# Patient Record
Sex: MALE | Race: WHITE | NOT HISPANIC OR LATINO | Employment: OTHER | URBAN - METROPOLITAN AREA
[De-identification: names, ages, dates, MRNs, and addresses within clinical notes are randomized per-mention and may not be internally consistent; named-entity substitution may affect disease eponyms.]

---

## 2024-07-24 ENCOUNTER — HOSPITAL ENCOUNTER (INPATIENT)
Facility: MEDICAL CENTER | Age: 84
LOS: 4 days | End: 2024-07-28
Attending: INTERNAL MEDICINE | Admitting: HOSPITALIST
Payer: MEDICARE

## 2024-07-24 DIAGNOSIS — E87.29 HIGH ANION GAP METABOLIC ACIDOSIS: ICD-10-CM

## 2024-07-24 PROBLEM — E87.6 HYPOKALEMIA: Status: ACTIVE | Noted: 2024-07-24

## 2024-07-24 PROBLEM — E11.10 DKA, TYPE 2, NOT AT GOAL (HCC): Status: ACTIVE | Noted: 2024-07-24

## 2024-07-24 PROBLEM — F10.11 HISTORY OF ALCOHOL ABUSE: Status: ACTIVE | Noted: 2024-07-24

## 2024-07-24 PROBLEM — F32.9 REACTIVE DEPRESSION: Status: ACTIVE | Noted: 2024-07-24

## 2024-07-24 PROBLEM — I48.0 PAROXYSMAL ATRIAL FIBRILLATION (HCC): Status: ACTIVE | Noted: 2024-07-24

## 2024-07-24 PROBLEM — K21.9 GERD (GASTROESOPHAGEAL REFLUX DISEASE): Status: ACTIVE | Noted: 2024-07-24

## 2024-07-24 LAB
ANION GAP SERPL CALC-SCNC: 16 MMOL/L (ref 7–16)
BUN SERPL-MCNC: 7 MG/DL (ref 8–22)
CALCIUM SERPL-MCNC: 8.5 MG/DL (ref 8.4–10.2)
CHLORIDE SERPL-SCNC: 102 MMOL/L (ref 96–112)
CO2 SERPL-SCNC: 19 MMOL/L (ref 20–33)
CREAT SERPL-MCNC: 0.64 MG/DL (ref 0.5–1.4)
ERYTHROCYTE [DISTWIDTH] IN BLOOD BY AUTOMATED COUNT: 47.7 FL (ref 35.9–50)
GFR SERPLBLD CREATININE-BSD FMLA CKD-EPI: 94 ML/MIN/1.73 M 2
GLUCOSE BLD STRIP.AUTO-MCNC: 122 MG/DL (ref 65–99)
GLUCOSE BLD STRIP.AUTO-MCNC: 123 MG/DL (ref 65–99)
GLUCOSE SERPL-MCNC: 117 MG/DL (ref 65–99)
HCT VFR BLD AUTO: 39.5 % (ref 42–52)
HGB BLD-MCNC: 13.8 G/DL (ref 14–18)
MAGNESIUM SERPL-MCNC: 1.7 MG/DL (ref 1.5–2.5)
MCH RBC QN AUTO: 33.7 PG (ref 27–33)
MCHC RBC AUTO-ENTMCNC: 34.9 G/DL (ref 32.3–36.5)
MCV RBC AUTO: 96.3 FL (ref 81.4–97.8)
PLATELET # BLD AUTO: 276 K/UL (ref 164–446)
PMV BLD AUTO: 10 FL (ref 9–12.9)
POTASSIUM SERPL-SCNC: 3.7 MMOL/L (ref 3.6–5.5)
RBC # BLD AUTO: 4.1 M/UL (ref 4.7–6.1)
SODIUM SERPL-SCNC: 137 MMOL/L (ref 135–145)
WBC # BLD AUTO: 7.9 K/UL (ref 4.8–10.8)

## 2024-07-24 PROCEDURE — 83036 HEMOGLOBIN GLYCOSYLATED A1C: CPT

## 2024-07-24 PROCEDURE — 770022 HCHG ROOM/CARE - ICU (200)

## 2024-07-24 PROCEDURE — 80048 BASIC METABOLIC PNL TOTAL CA: CPT

## 2024-07-24 PROCEDURE — 85027 COMPLETE CBC AUTOMATED: CPT

## 2024-07-24 PROCEDURE — A9270 NON-COVERED ITEM OR SERVICE: HCPCS | Performed by: HOSPITALIST

## 2024-07-24 PROCEDURE — 700102 HCHG RX REV CODE 250 W/ 637 OVERRIDE(OP): Performed by: HOSPITALIST

## 2024-07-24 PROCEDURE — 82962 GLUCOSE BLOOD TEST: CPT

## 2024-07-24 PROCEDURE — 99223 1ST HOSP IP/OBS HIGH 75: CPT | Mod: AI | Performed by: HOSPITALIST

## 2024-07-24 PROCEDURE — 700105 HCHG RX REV CODE 258: Performed by: HOSPITALIST

## 2024-07-24 PROCEDURE — 700111 HCHG RX REV CODE 636 W/ 250 OVERRIDE (IP): Performed by: INTERNAL MEDICINE

## 2024-07-24 PROCEDURE — 83735 ASSAY OF MAGNESIUM: CPT

## 2024-07-24 RX ORDER — SODIUM CHLORIDE, SODIUM LACTATE, POTASSIUM CHLORIDE, CALCIUM CHLORIDE 600; 310; 30; 20 MG/100ML; MG/100ML; MG/100ML; MG/100ML
INJECTION, SOLUTION INTRAVENOUS CONTINUOUS
Status: DISCONTINUED | OUTPATIENT
Start: 2024-07-24 | End: 2024-07-25

## 2024-07-24 RX ORDER — AMOXICILLIN 250 MG
2 CAPSULE ORAL EVERY EVENING
Status: DISCONTINUED | OUTPATIENT
Start: 2024-07-24 | End: 2024-07-28 | Stop reason: HOSPADM

## 2024-07-24 RX ORDER — DILTIAZEM HYDROCHLORIDE 240 MG/1
240 CAPSULE, COATED, EXTENDED RELEASE ORAL DAILY
COMMUNITY

## 2024-07-24 RX ORDER — DILTIAZEM HYDROCHLORIDE 120 MG/1
120 CAPSULE, COATED, EXTENDED RELEASE ORAL DAILY
Status: DISCONTINUED | OUTPATIENT
Start: 2024-07-25 | End: 2024-07-26

## 2024-07-24 RX ORDER — ONDANSETRON 4 MG/1
4 TABLET, ORALLY DISINTEGRATING ORAL EVERY 4 HOURS PRN
Status: DISCONTINUED | OUTPATIENT
Start: 2024-07-24 | End: 2024-07-28 | Stop reason: HOSPADM

## 2024-07-24 RX ORDER — DULOXETIN HYDROCHLORIDE 20 MG/1
20 CAPSULE, DELAYED RELEASE ORAL DAILY
Status: DISCONTINUED | OUTPATIENT
Start: 2024-07-25 | End: 2024-07-28 | Stop reason: HOSPADM

## 2024-07-24 RX ORDER — POTASSIUM CHLORIDE 1500 MG/1
10 TABLET, EXTENDED RELEASE ORAL DAILY
Status: DISCONTINUED | OUTPATIENT
Start: 2024-07-25 | End: 2024-07-28 | Stop reason: HOSPADM

## 2024-07-24 RX ORDER — MAGNESIUM SULFATE HEPTAHYDRATE 40 MG/ML
2 INJECTION, SOLUTION INTRAVENOUS ONCE
Status: COMPLETED | OUTPATIENT
Start: 2024-07-24 | End: 2024-07-25

## 2024-07-24 RX ORDER — DULOXETIN HYDROCHLORIDE 20 MG/1
20 CAPSULE, DELAYED RELEASE ORAL DAILY
COMMUNITY

## 2024-07-24 RX ORDER — ONDANSETRON 2 MG/ML
4 INJECTION INTRAMUSCULAR; INTRAVENOUS EVERY 4 HOURS PRN
Status: DISCONTINUED | OUTPATIENT
Start: 2024-07-24 | End: 2024-07-28 | Stop reason: HOSPADM

## 2024-07-24 RX ORDER — ESCITALOPRAM OXALATE 10 MG/1
20 TABLET ORAL DAILY
COMMUNITY

## 2024-07-24 RX ORDER — GABAPENTIN 300 MG/1
300 CAPSULE ORAL DAILY
COMMUNITY

## 2024-07-24 RX ORDER — POLYETHYLENE GLYCOL 3350 17 G/17G
1 POWDER, FOR SOLUTION ORAL
Status: DISCONTINUED | OUTPATIENT
Start: 2024-07-24 | End: 2024-07-28 | Stop reason: HOSPADM

## 2024-07-24 RX ORDER — FINASTERIDE 5 MG/1
5 TABLET, FILM COATED ORAL DAILY
COMMUNITY

## 2024-07-24 RX ORDER — FINASTERIDE 5 MG/1
5 TABLET, FILM COATED ORAL DAILY
Status: DISCONTINUED | OUTPATIENT
Start: 2024-07-25 | End: 2024-07-28 | Stop reason: HOSPADM

## 2024-07-24 RX ORDER — POTASSIUM CHLORIDE 1.5 G/1.58G
10 POWDER, FOR SOLUTION ORAL DAILY
COMMUNITY

## 2024-07-24 RX ORDER — DEXTROSE MONOHYDRATE 25 G/50ML
25 INJECTION, SOLUTION INTRAVENOUS
Status: DISCONTINUED | OUTPATIENT
Start: 2024-07-24 | End: 2024-07-25

## 2024-07-24 RX ORDER — GABAPENTIN 300 MG/1
300 CAPSULE ORAL DAILY
Status: DISCONTINUED | OUTPATIENT
Start: 2024-07-25 | End: 2024-07-28 | Stop reason: HOSPADM

## 2024-07-24 RX ORDER — LABETALOL HYDROCHLORIDE 5 MG/ML
10 INJECTION, SOLUTION INTRAVENOUS EVERY 4 HOURS PRN
Status: DISCONTINUED | OUTPATIENT
Start: 2024-07-24 | End: 2024-07-28 | Stop reason: HOSPADM

## 2024-07-24 RX ORDER — PANTOPRAZOLE SODIUM 40 MG/1
40 TABLET, DELAYED RELEASE ORAL DAILY
COMMUNITY

## 2024-07-24 RX ORDER — ESCITALOPRAM OXALATE 10 MG/1
20 TABLET ORAL DAILY
Status: DISCONTINUED | OUTPATIENT
Start: 2024-07-25 | End: 2024-07-28 | Stop reason: HOSPADM

## 2024-07-24 RX ORDER — POTASSIUM CHLORIDE 7.45 MG/ML
10 INJECTION INTRAVENOUS
Status: COMPLETED | OUTPATIENT
Start: 2024-07-24 | End: 2024-07-25

## 2024-07-24 RX ADMIN — SENNOSIDES AND DOCUSATE SODIUM 2 TABLET: 50; 8.6 TABLET ORAL at 20:36

## 2024-07-24 RX ADMIN — POTASSIUM CHLORIDE 10 MEQ: 7.46 INJECTION, SOLUTION INTRAVENOUS at 22:23

## 2024-07-24 RX ADMIN — APIXABAN 5 MG: 5 TABLET, FILM COATED ORAL at 20:37

## 2024-07-24 RX ADMIN — POTASSIUM CHLORIDE 10 MEQ: 7.46 INJECTION, SOLUTION INTRAVENOUS at 23:25

## 2024-07-24 RX ADMIN — MAGNESIUM SULFATE HEPTAHYDRATE 2 G: 2 INJECTION, SOLUTION INTRAVENOUS at 22:24

## 2024-07-24 RX ADMIN — SODIUM CHLORIDE, POTASSIUM CHLORIDE, SODIUM LACTATE AND CALCIUM CHLORIDE: 600; 310; 30; 20 INJECTION, SOLUTION INTRAVENOUS at 20:42

## 2024-07-24 SDOH — ECONOMIC STABILITY: TRANSPORTATION INSECURITY
IN THE PAST 12 MONTHS, HAS LACK OF RELIABLE TRANSPORTATION KEPT YOU FROM MEDICAL APPOINTMENTS, MEETINGS, WORK OR FROM GETTING THINGS NEEDED FOR DAILY LIVING?: NO

## 2024-07-24 SDOH — ECONOMIC STABILITY: TRANSPORTATION INSECURITY
IN THE PAST 12 MONTHS, HAS THE LACK OF TRANSPORTATION KEPT YOU FROM MEDICAL APPOINTMENTS OR FROM GETTING MEDICATIONS?: NO

## 2024-07-24 ASSESSMENT — LIFESTYLE VARIABLES
TOTAL SCORE: 0
EVER HAD A DRINK FIRST THING IN THE MORNING TO STEADY YOUR NERVES TO GET RID OF A HANGOVER: NO
TOTAL SCORE: 0
AVERAGE NUMBER OF DAYS PER WEEK YOU HAVE A DRINK CONTAINING ALCOHOL: 0
CONSUMPTION TOTAL: POSITIVE
SUBSTANCE_ABUSE: 0
HAVE PEOPLE ANNOYED YOU BY CRITICIZING YOUR DRINKING: NO
ALCOHOL_USE: NO
TOTAL SCORE: 0
HOW MANY TIMES IN THE PAST YEAR HAVE YOU HAD 5 OR MORE DRINKS IN A DAY: 100
HAVE YOU EVER FELT YOU SHOULD CUT DOWN ON YOUR DRINKING: NO
ON A TYPICAL DAY WHEN YOU DRINK ALCOHOL HOW MANY DRINKS DO YOU HAVE: 0
EVER FELT BAD OR GUILTY ABOUT YOUR DRINKING: NO

## 2024-07-24 ASSESSMENT — ENCOUNTER SYMPTOMS
MUSCULOSKELETAL NEGATIVE: 1
POLYDIPSIA: 0
EYES NEGATIVE: 1
FOCAL WEAKNESS: 0
TINGLING: 0
ORTHOPNEA: 0
PND: 0
NEUROLOGICAL NEGATIVE: 1
INSOMNIA: 0
BLOOD IN STOOL: 0
EYE REDNESS: 0
SEIZURES: 0
RESPIRATORY NEGATIVE: 1
SENSORY CHANGE: 0
DEPRESSION: 0
PSYCHIATRIC NEGATIVE: 1
HEARTBURN: 0
BACK PAIN: 0
STRIDOR: 0
VOMITING: 1
MYALGIAS: 0
FEVER: 0
FLANK PAIN: 0
COUGH: 0
PHOTOPHOBIA: 0
EYE DISCHARGE: 0
SPUTUM PRODUCTION: 0
ABDOMINAL PAIN: 1
CARDIOVASCULAR NEGATIVE: 1
BRUISES/BLEEDS EASILY: 0
SINUS PAIN: 0
SHORTNESS OF BREATH: 0
CHILLS: 0
FALLS: 0
DOUBLE VISION: 0
WHEEZING: 0
DIZZINESS: 0
DIAPHORESIS: 0
NAUSEA: 1

## 2024-07-24 ASSESSMENT — COGNITIVE AND FUNCTIONAL STATUS - GENERAL
DAILY ACTIVITIY SCORE: 24
SUGGESTED CMS G CODE MODIFIER MOBILITY: CH
SUGGESTED CMS G CODE MODIFIER DAILY ACTIVITY: CH
MOBILITY SCORE: 24

## 2024-07-24 ASSESSMENT — SOCIAL DETERMINANTS OF HEALTH (SDOH)
WITHIN THE LAST YEAR, HAVE YOU BEEN HUMILIATED OR EMOTIONALLY ABUSED IN OTHER WAYS BY YOUR PARTNER OR EX-PARTNER?: NO
WITHIN THE PAST 12 MONTHS, THE FOOD YOU BOUGHT JUST DIDN'T LAST AND YOU DIDN'T HAVE MONEY TO GET MORE: NEVER TRUE
WITHIN THE LAST YEAR, HAVE YOU BEEN KICKED, HIT, SLAPPED, OR OTHERWISE PHYSICALLY HURT BY YOUR PARTNER OR EX-PARTNER?: NO
WITHIN THE LAST YEAR, HAVE YOU BEEN AFRAID OF YOUR PARTNER OR EX-PARTNER?: NO
WITHIN THE PAST 12 MONTHS, YOU WORRIED THAT YOUR FOOD WOULD RUN OUT BEFORE YOU GOT THE MONEY TO BUY MORE: NEVER TRUE
WITHIN THE LAST YEAR, HAVE TO BEEN RAPED OR FORCED TO HAVE ANY KIND OF SEXUAL ACTIVITY BY YOUR PARTNER OR EX-PARTNER?: NO
IN THE PAST 12 MONTHS, HAS THE ELECTRIC, GAS, OIL, OR WATER COMPANY THREATENED TO SHUT OFF SERVICE IN YOUR HOME?: NO

## 2024-07-24 ASSESSMENT — PATIENT HEALTH QUESTIONNAIRE - PHQ9
1. LITTLE INTEREST OR PLEASURE IN DOING THINGS: NOT AT ALL
SUM OF ALL RESPONSES TO PHQ9 QUESTIONS 1 AND 2: 0
2. FEELING DOWN, DEPRESSED, IRRITABLE, OR HOPELESS: NOT AT ALL

## 2024-07-24 ASSESSMENT — PAIN DESCRIPTION - PAIN TYPE: TYPE: ACUTE PAIN

## 2024-07-24 NOTE — PROGRESS NOTES
[facility] DIRECT ADMIT ACCEPTANCE NOTE    Transferring facility: [facility]  Transferring provider: Dr Fonseca    Chief complaint: N/V  Pertinent history & patient course: recently started on farxiga, presumably for HF found to have ketotic AGMA, A1C 5.7% - started on insulin and gluocse gtt  Pertinent imaging & lab results: bicarb 18, AG 25, + ketones, A1C 10.7%, other labs normal   Consultants called prior to transfer and pertinent input from consultants: none  Code Status: full per transferring provider, I personally verified with the transferring provider patient's code status and the transferring provider has confirmed this with the patient.  Reason for Transfer: icu level of care  Further work up or recommendations requested prior to transfer: no    Patient accepted for transfer: Yes  Accepting [facility] Facility: [facility] - Nursing to notify the admitting provider when patient arrives to the unit.    Consultants to be called upon arrival: none  Admission status: Inpatient.   Floor requested: ICU    The admitting provider is the point of contact for questions or concerns regarding the patient's care.    __________  This note was generated using voice recognition software which has a chance of producing errors of grammar and content.  I have made every reasonable attempt to find and correct any errors, but it should be expected that some may not be found prior to finalization of this note.    Danelle Jimenez, DO   Pulmonary and Critical Care

## 2024-07-25 PROBLEM — E11.10 DKA, TYPE 2, NOT AT GOAL (HCC): Status: RESOLVED | Noted: 2024-07-24 | Resolved: 2024-07-25

## 2024-07-25 LAB
ALBUMIN SERPL BCP-MCNC: 3.4 G/DL (ref 3.2–4.9)
ALBUMIN/GLOB SERPL: 1.4 G/DL
ALP SERPL-CCNC: 90 U/L (ref 30–99)
ALT SERPL-CCNC: 6 U/L (ref 2–50)
ANION GAP SERPL CALC-SCNC: 11 MMOL/L (ref 7–16)
ANION GAP SERPL CALC-SCNC: 13 MMOL/L (ref 7–16)
ANION GAP SERPL CALC-SCNC: 14 MMOL/L (ref 7–16)
ANION GAP SERPL CALC-SCNC: 15 MMOL/L (ref 7–16)
AST SERPL-CCNC: 16 U/L (ref 12–45)
B-OH-BUTYR SERPL-MCNC: 2.2 MMOL/L (ref 0.02–0.27)
BILIRUB SERPL-MCNC: 0.7 MG/DL (ref 0.1–1.5)
BUN SERPL-MCNC: 5 MG/DL (ref 8–22)
BUN SERPL-MCNC: 5 MG/DL (ref 8–22)
BUN SERPL-MCNC: 6 MG/DL (ref 8–22)
BUN SERPL-MCNC: 6 MG/DL (ref 8–22)
CALCIUM ALBUM COR SERPL-MCNC: 8.9 MG/DL (ref 8.5–10.5)
CALCIUM SERPL-MCNC: 8.4 MG/DL (ref 8.4–10.2)
CALCIUM SERPL-MCNC: 8.5 MG/DL (ref 8.4–10.2)
CALCIUM SERPL-MCNC: 8.7 MG/DL (ref 8.4–10.2)
CALCIUM SERPL-MCNC: 8.7 MG/DL (ref 8.4–10.2)
CHLORIDE SERPL-SCNC: 100 MMOL/L (ref 96–112)
CHLORIDE SERPL-SCNC: 101 MMOL/L (ref 96–112)
CHLORIDE SERPL-SCNC: 101 MMOL/L (ref 96–112)
CHLORIDE SERPL-SCNC: 103 MMOL/L (ref 96–112)
CO2 SERPL-SCNC: 20 MMOL/L (ref 20–33)
CO2 SERPL-SCNC: 21 MMOL/L (ref 20–33)
CO2 SERPL-SCNC: 21 MMOL/L (ref 20–33)
CO2 SERPL-SCNC: 23 MMOL/L (ref 20–33)
CREAT SERPL-MCNC: 0.56 MG/DL (ref 0.5–1.4)
CREAT SERPL-MCNC: 0.56 MG/DL (ref 0.5–1.4)
CREAT SERPL-MCNC: 0.57 MG/DL (ref 0.5–1.4)
CREAT SERPL-MCNC: 0.59 MG/DL (ref 0.5–1.4)
ERYTHROCYTE [DISTWIDTH] IN BLOOD BY AUTOMATED COUNT: 49.2 FL (ref 35.9–50)
EST. AVERAGE GLUCOSE BLD GHB EST-MCNC: 103 MG/DL
GFR SERPLBLD CREATININE-BSD FMLA CKD-EPI: 97 ML/MIN/1.73 M 2
GFR SERPLBLD CREATININE-BSD FMLA CKD-EPI: 98 ML/MIN/1.73 M 2
GLOBULIN SER CALC-MCNC: 2.4 G/DL (ref 1.9–3.5)
GLUCOSE BLD STRIP.AUTO-MCNC: 102 MG/DL (ref 65–99)
GLUCOSE BLD STRIP.AUTO-MCNC: 108 MG/DL (ref 65–99)
GLUCOSE BLD STRIP.AUTO-MCNC: 110 MG/DL (ref 65–99)
GLUCOSE BLD STRIP.AUTO-MCNC: 112 MG/DL (ref 65–99)
GLUCOSE BLD STRIP.AUTO-MCNC: 113 MG/DL (ref 65–99)
GLUCOSE BLD STRIP.AUTO-MCNC: 124 MG/DL (ref 65–99)
GLUCOSE BLD STRIP.AUTO-MCNC: 132 MG/DL (ref 65–99)
GLUCOSE BLD STRIP.AUTO-MCNC: 92 MG/DL (ref 65–99)
GLUCOSE BLD STRIP.AUTO-MCNC: 97 MG/DL (ref 65–99)
GLUCOSE SERPL-MCNC: 107 MG/DL (ref 65–99)
GLUCOSE SERPL-MCNC: 125 MG/DL (ref 65–99)
GLUCOSE SERPL-MCNC: 86 MG/DL (ref 65–99)
GLUCOSE SERPL-MCNC: 91 MG/DL (ref 65–99)
HBA1C MFR BLD: 5.2 % (ref 4–5.6)
HCT VFR BLD AUTO: 38 % (ref 42–52)
HGB BLD-MCNC: 13.4 G/DL (ref 14–18)
MAGNESIUM SERPL-MCNC: 1.8 MG/DL (ref 1.5–2.5)
MAGNESIUM SERPL-MCNC: 2 MG/DL (ref 1.5–2.5)
MCH RBC QN AUTO: 34 PG (ref 27–33)
MCHC RBC AUTO-ENTMCNC: 35.3 G/DL (ref 32.3–36.5)
MCV RBC AUTO: 96.4 FL (ref 81.4–97.8)
PHOSPHATE SERPL-MCNC: 2.2 MG/DL (ref 2.5–4.5)
PLATELET # BLD AUTO: 266 K/UL (ref 164–446)
PMV BLD AUTO: 10 FL (ref 9–12.9)
POTASSIUM SERPL-SCNC: 3.4 MMOL/L (ref 3.6–5.5)
POTASSIUM SERPL-SCNC: 3.4 MMOL/L (ref 3.6–5.5)
POTASSIUM SERPL-SCNC: 3.8 MMOL/L (ref 3.6–5.5)
POTASSIUM SERPL-SCNC: 3.8 MMOL/L (ref 3.6–5.5)
PROT SERPL-MCNC: 5.8 G/DL (ref 6–8.2)
RBC # BLD AUTO: 3.94 M/UL (ref 4.7–6.1)
SODIUM SERPL-SCNC: 135 MMOL/L (ref 135–145)
SODIUM SERPL-SCNC: 135 MMOL/L (ref 135–145)
SODIUM SERPL-SCNC: 136 MMOL/L (ref 135–145)
SODIUM SERPL-SCNC: 137 MMOL/L (ref 135–145)
WBC # BLD AUTO: 7.7 K/UL (ref 4.8–10.8)

## 2024-07-25 PROCEDURE — 83735 ASSAY OF MAGNESIUM: CPT

## 2024-07-25 PROCEDURE — 770020 HCHG ROOM/CARE - TELE (206)

## 2024-07-25 PROCEDURE — 80048 BASIC METABOLIC PNL TOTAL CA: CPT | Mod: 91

## 2024-07-25 PROCEDURE — 80053 COMPREHEN METABOLIC PANEL: CPT

## 2024-07-25 PROCEDURE — 97535 SELF CARE MNGMENT TRAINING: CPT

## 2024-07-25 PROCEDURE — 700105 HCHG RX REV CODE 258: Performed by: HOSPITALIST

## 2024-07-25 PROCEDURE — 99291 CRITICAL CARE FIRST HOUR: CPT | Performed by: INTERNAL MEDICINE

## 2024-07-25 PROCEDURE — 700102 HCHG RX REV CODE 250 W/ 637 OVERRIDE(OP): Performed by: INTERNAL MEDICINE

## 2024-07-25 PROCEDURE — 85027 COMPLETE CBC AUTOMATED: CPT

## 2024-07-25 PROCEDURE — 700105 HCHG RX REV CODE 258: Performed by: INTERNAL MEDICINE

## 2024-07-25 PROCEDURE — 700102 HCHG RX REV CODE 250 W/ 637 OVERRIDE(OP): Performed by: HOSPITALIST

## 2024-07-25 PROCEDURE — 82010 KETONE BODYS QUAN: CPT

## 2024-07-25 PROCEDURE — 82962 GLUCOSE BLOOD TEST: CPT | Mod: 91

## 2024-07-25 PROCEDURE — 770001 HCHG ROOM/CARE - MED/SURG/GYN PRIV*

## 2024-07-25 PROCEDURE — 84100 ASSAY OF PHOSPHORUS: CPT

## 2024-07-25 PROCEDURE — A9270 NON-COVERED ITEM OR SERVICE: HCPCS | Performed by: INTERNAL MEDICINE

## 2024-07-25 PROCEDURE — 700111 HCHG RX REV CODE 636 W/ 250 OVERRIDE (IP): Mod: JZ | Performed by: INTERNAL MEDICINE

## 2024-07-25 PROCEDURE — 97166 OT EVAL MOD COMPLEX 45 MIN: CPT

## 2024-07-25 PROCEDURE — A9270 NON-COVERED ITEM OR SERVICE: HCPCS | Performed by: HOSPITALIST

## 2024-07-25 RX ORDER — POTASSIUM CHLORIDE 7.45 MG/ML
10 INJECTION INTRAVENOUS
Status: COMPLETED | OUTPATIENT
Start: 2024-07-25 | End: 2024-07-25

## 2024-07-25 RX ORDER — DEXTROSE, SODIUM CHLORIDE, SODIUM LACTATE, POTASSIUM CHLORIDE, AND CALCIUM CHLORIDE 5; .6; .31; .03; .02 G/100ML; G/100ML; G/100ML; G/100ML; G/100ML
INJECTION, SOLUTION INTRAVENOUS CONTINUOUS
Status: DISCONTINUED | OUTPATIENT
Start: 2024-07-25 | End: 2024-07-25

## 2024-07-25 RX ORDER — DEXTROSE MONOHYDRATE 25 G/50ML
12.5-25 INJECTION, SOLUTION INTRAVENOUS PRN
Status: DISCONTINUED | OUTPATIENT
Start: 2024-07-25 | End: 2024-07-28 | Stop reason: HOSPADM

## 2024-07-25 RX ORDER — POTASSIUM CHLORIDE 1500 MG/1
40 TABLET, EXTENDED RELEASE ORAL ONCE
Status: COMPLETED | OUTPATIENT
Start: 2024-07-25 | End: 2024-07-25

## 2024-07-25 RX ORDER — DEXTROSE AND SODIUM CHLORIDE 10; .45 G/100ML; G/100ML
INJECTION, SOLUTION INTRAVENOUS CONTINUOUS
Status: DISCONTINUED | OUTPATIENT
Start: 2024-07-25 | End: 2024-07-26

## 2024-07-25 RX ADMIN — MULTIVITAMIN TABLET 1 TABLET: TABLET at 10:36

## 2024-07-25 RX ADMIN — POTASSIUM CHLORIDE 10 MEQ: 10 INJECTION, SOLUTION INTRAVENOUS at 19:54

## 2024-07-25 RX ADMIN — POTASSIUM CHLORIDE 40 MEQ: 1500 TABLET, EXTENDED RELEASE ORAL at 16:28

## 2024-07-25 RX ADMIN — SODIUM CHLORIDE, POTASSIUM CHLORIDE, SODIUM LACTATE AND CALCIUM CHLORIDE: 600; 310; 30; 20 INJECTION, SOLUTION INTRAVENOUS at 03:38

## 2024-07-25 RX ADMIN — APIXABAN 5 MG: 5 TABLET, FILM COATED ORAL at 05:55

## 2024-07-25 RX ADMIN — APIXABAN 5 MG: 5 TABLET, FILM COATED ORAL at 17:26

## 2024-07-25 RX ADMIN — DEXTROSE AND SODIUM CHLORIDE: 10; .45 INJECTION, SOLUTION INTRAVENOUS at 16:16

## 2024-07-25 RX ADMIN — DILTIAZEM HYDROCHLORIDE 120 MG: 120 CAPSULE, COATED, EXTENDED RELEASE ORAL at 05:54

## 2024-07-25 RX ADMIN — GABAPENTIN 300 MG: 300 CAPSULE ORAL at 05:53

## 2024-07-25 RX ADMIN — OMEPRAZOLE 20 MG: 20 CAPSULE, DELAYED RELEASE ORAL at 05:56

## 2024-07-25 RX ADMIN — POTASSIUM CHLORIDE 10 MEQ: 10 INJECTION, SOLUTION INTRAVENOUS at 18:46

## 2024-07-25 RX ADMIN — SODIUM CHLORIDE 1 UNITS/HR: 9 INJECTION, SOLUTION INTRAVENOUS at 16:20

## 2024-07-25 RX ADMIN — SODIUM CHLORIDE, POTASSIUM CHLORIDE, SODIUM LACTATE AND CALCIUM CHLORIDE: 600; 310; 30; 20 INJECTION, SOLUTION INTRAVENOUS at 11:09

## 2024-07-25 RX ADMIN — DULOXETINE HYDROCHLORIDE 20 MG: 20 CAPSULE, DELAYED RELEASE ORAL at 05:54

## 2024-07-25 RX ADMIN — POTASSIUM CHLORIDE 10 MEQ: 1500 TABLET, EXTENDED RELEASE ORAL at 05:55

## 2024-07-25 RX ADMIN — SODIUM CHLORIDE, SODIUM LACTATE, POTASSIUM CHLORIDE, CALCIUM CHLORIDE AND DEXTROSE MONOHYDRATE: 5; 600; 310; 30; 20 INJECTION, SOLUTION INTRAVENOUS at 14:09

## 2024-07-25 RX ADMIN — ESCITALOPRAM OXALATE 20 MG: 10 TABLET ORAL at 05:55

## 2024-07-25 ASSESSMENT — ENCOUNTER SYMPTOMS
WHEEZING: 0
DEPRESSION: 0
NAUSEA: 1
SHORTNESS OF BREATH: 0
CHILLS: 0
COUGH: 0
MYALGIAS: 0
PALPITATIONS: 0
VOMITING: 1
DIZZINESS: 0
HEADACHES: 0
FEVER: 0

## 2024-07-25 ASSESSMENT — PAIN DESCRIPTION - PAIN TYPE
TYPE: ACUTE PAIN

## 2024-07-25 ASSESSMENT — ACTIVITIES OF DAILY LIVING (ADL): TOILETING: INDEPENDENT

## 2024-07-25 ASSESSMENT — COGNITIVE AND FUNCTIONAL STATUS - GENERAL
TOILETING: A LITTLE
SUGGESTED CMS G CODE MODIFIER DAILY ACTIVITY: CJ
HELP NEEDED FOR BATHING: A LITTLE
DRESSING REGULAR LOWER BODY CLOTHING: A LITTLE
DAILY ACTIVITIY SCORE: 21

## 2024-07-25 ASSESSMENT — GAIT ASSESSMENTS: DISTANCE (FEET): 180

## 2024-07-25 NOTE — ASSESSMENT & PLAN NOTE
DKA repeat labs and see if potassium supplementation still needs to be done initial potassium at outlying facility was 3.4  Check magnesium

## 2024-07-25 NOTE — PROGRESS NOTES
4 Eyes Skin Assessment Completed by BUCK Jason and BUCK Pascual.    Head WDL  Ears WDL  Nose WDL  Mouth WDL  Neck WDL  Breast/Chest WDL  Shoulder Blades WDL  Spine WDL  (R) Arm/Elbow/Hand WDL  (L) Arm/Elbow/Hand WDL  Abdomen WDL  Groin WDL  Scrotum/Coccyx/Buttocks Redness and Blanching  (R) Leg WDL  (L) Leg WDL  (R) Heel/Foot/Toe WDL  (L) Heel/Foot/Toe WDL          Devices In Places ECG, Blood Pressure Cuff, and Pulse Ox      Interventions In Place Pillows and Low Air Loss Mattress    Possible Skin Injury No    Pictures Uploaded Into Epic N/A  Wound Consult Placed N/A  RN Wound Prevention Protocol Ordered No

## 2024-07-25 NOTE — ASSESSMENT & PLAN NOTE
Patient reports that he has abstained from drinking for about a month  Will start daily multivitamins

## 2024-07-25 NOTE — H&P
Hospital Medicine History & Physical Note    Date of Service  7/24/2024    Primary Care Physician  No primary care provider on file.    Consultants  critical care    Specialist Names: Dr. Danelle Jimenez    Code Status  Full Code    Chief Complaint  No chief complaint on file.      History of Presenting Illness  Pamela Costa is a 84 y.o. male who presented 7/24/2024 on transfer from outside facility.  Patient came to us from [facility] where the patient reported to after he was started on Farxiga and he developed nausea vomiting and went into hyperglycemia with increased anion gap metabolic acidosis.  The patient appeared to be in DKA.  His acetone is positive on the outside labs.  His bicarb is down to 16 and his anion gap is 25.  The patient initially was started on fluid resuscitation and insulin drip.  The patient at this point will need continued evaluation to see if his anion gap has closed and if his anion gap has closed we will not restart him on insulin drip if it is still high we will need to initiate an insulin drip.  I discussed this with pharmacy and we will await stat labs.    I discussed the plan of care with patient, bedside RN, pharmacy, and critical care intensivist .    Review of Systems  Review of Systems   Constitutional:  Positive for malaise/fatigue. Negative for chills, diaphoresis and fever.   HENT:  Positive for hearing loss. Negative for nosebleeds and sinus pain.    Eyes: Negative.  Negative for double vision, photophobia, discharge and redness.   Respiratory: Negative.  Negative for cough, sputum production, shortness of breath, wheezing and stridor.    Cardiovascular: Negative.  Negative for chest pain, orthopnea, leg swelling and PND.   Gastrointestinal:  Positive for abdominal pain, nausea and vomiting. Negative for blood in stool and heartburn.   Genitourinary: Negative.  Negative for dysuria, flank pain and frequency.   Musculoskeletal: Negative.  Negative for  back pain, falls and myalgias.   Skin: Negative.  Negative for itching.   Neurological: Negative.  Negative for dizziness, tingling, sensory change, focal weakness and seizures.   Endo/Heme/Allergies: Negative.  Negative for polydipsia. Does not bruise/bleed easily.   Psychiatric/Behavioral: Negative.  Negative for depression, substance abuse and suicidal ideas. The patient does not have insomnia.    All other systems reviewed and are negative.      Past Medical History   has no past medical history on file.    Surgical History   has no past surgical history on file.     Family History  family history is not on file.   Family history reviewed with patient. There is no family history that is pertinent to the chief complaint.     Social History       Allergies  No Known Allergies    Medications  Prior to Admission Medications   Prescriptions Last Dose Informant Patient Reported? Taking?   DULoxetine (CYMBALTA) 20 MG Cap DR Particles 7/24/2024 at 0830  Yes Yes   Sig: Take 20 mg by mouth every day.   apixaban (ELIQUIS) 5mg Tab 7/24/2024 at 0830  Yes Yes   Sig: Take 5 mg by mouth 2 times a day.   dapagliflozin or PLACEBO (STUDY DRUG) 10 MG tablet 7/24/2024 at 0830  Yes Yes   Sig: Take 10 mg by mouth every day.   dilTIAZem CD (CARDIZEM CD) 240 MG CAPSULE SR 24 HR 7/24/2024 at 0830  Yes Yes   Sig: Take 240 mg by mouth every day.   escitalopram (LEXAPRO) 10 MG Tab 7/24/2024 at 0830  Yes Yes   Sig: Take 20 mg by mouth every day.   finasteride (PROSCAR) 5 MG Tab 7/24/2024 at 0830  Yes Yes   Sig: Take 5 mg by mouth every day.   gabapentin (NEURONTIN) 300 MG Cap 7/24/2024 at 0830  Yes Yes   Sig: Take 300 mg by mouth every day.   pantoprazole (PROTONIX) 40 MG Tablet Delayed Response 7/24/2024 at 0830  Yes Yes   Sig: Take 40 mg by mouth every day.   potassium chloride (KLOR-CON) 20 MEQ Pack 7/24/2024 at 0830  Yes Yes   Sig: Take 10 mEq by mouth every day.      Facility-Administered Medications: None       Physical Exam  Temp:   [36.8 °C (98.2 °F)] 36.8 °C (98.2 °F)  Pulse:  [99] 99  BP: (130)/(89) 130/89  SpO2:  [94 %] 94 %  Blood Pressure : 130/89   Temperature: 36.8 °C (98.2 °F)   Pulse: 99       Pulse Oximetry: 94 %       Physical Exam  Vitals and nursing note reviewed. Exam conducted with a chaperone present.   Constitutional:       General: He is not in acute distress.     Appearance: Normal appearance. He is well-developed and normal weight. He is not ill-appearing, toxic-appearing or diaphoretic.   HENT:      Head: Normocephalic and atraumatic.      Right Ear: External ear normal.      Left Ear: External ear normal.      Nose: Nose normal.      Mouth/Throat:      Mouth: Mucous membranes are moist.      Pharynx: Oropharynx is clear.   Eyes:      Extraocular Movements: Extraocular movements intact.      Conjunctiva/sclera: Conjunctivae normal.      Pupils: Pupils are equal, round, and reactive to light.   Neck:      Thyroid: No thyromegaly.      Vascular: No JVD.   Cardiovascular:      Rate and Rhythm: Normal rate and regular rhythm.      Pulses: Normal pulses.      Heart sounds: Murmur heard.   Pulmonary:      Effort: Pulmonary effort is normal.      Breath sounds: Normal breath sounds.   Chest:      Chest wall: No tenderness.   Abdominal:      General: Abdomen is flat. Bowel sounds are normal. There is no distension.      Palpations: Abdomen is soft. There is no mass.      Tenderness: There is no abdominal tenderness. There is no guarding or rebound.   Musculoskeletal:         General: Normal range of motion.      Cervical back: Normal range of motion and neck supple.      Right lower leg: No edema.      Left lower leg: No edema.   Lymphadenopathy:      Cervical: No cervical adenopathy.   Skin:     General: Skin is warm and dry.      Capillary Refill: Capillary refill takes less than 2 seconds.      Coloration: Skin is not jaundiced or pale.      Findings: No rash.   Neurological:      General: No focal deficit present.      Mental  "Status: He is alert and oriented to person, place, and time. Mental status is at baseline.      GCS: GCS eye subscore is 4. GCS verbal subscore is 5. GCS motor subscore is 6.      Cranial Nerves: No cranial nerve deficit.      Sensory: No sensory deficit.      Deep Tendon Reflexes: Reflexes are normal and symmetric.   Psychiatric:         Mood and Affect: Mood normal.         Behavior: Behavior normal.         Thought Content: Thought content normal.         Judgment: Judgment normal.         Laboratory:          No results for input(s): \"ALTSGPT\", \"ASTSGOT\", \"ALKPHOSPHAT\", \"TBILIRUBIN\", \"DBILIRUBIN\", \"GAMMAGT\", \"AMYLASE\", \"LIPASE\", \"ALB\", \"PREALBUMIN\", \"GLUCOSE\" in the last 72 hours.      No results for input(s): \"NTPROBNP\" in the last 72 hours.      No results for input(s): \"TROPONINT\" in the last 72 hours.    Imaging:  No orders to display       X-Ray:  I have personally reviewed the images and compared with prior images.  EKG:  I have personally reviewed the images and compared with prior images.    Assessment/Plan:  Justification for Admission Status  I anticipate this patient will require at least two midnights for appropriate medical management, necessitating inpatient admission because given the fact the patient is with diabetic ketoacidosis will require at least 48 hours of inpatient management    Patient will need a ICU (Adult and Pediatrics) bed on MEDICAL service .  The need is secondary to DKA with need for insulin drip and strict monitoring.    * High anion gap metabolic acidosis- (present on admission)  Assessment & Plan  See if the anion gap is closed initial anion was 25 and the bicarb was 16  Fluid resuscitation  Possible insulin drip  Possible Accu-Cheks with sliding scale coverage depending on how the anion gap has improved    DKA, type 2, not at goal (HCC)- (present on admission)  Assessment & Plan  Patient apparently has been and started on Farxiga with the Farxiga developed nausea " vomiting  Patient went into what appears to be early DKA  At the treating facility they started him on insulin drip and fluids [facility]  Will recheck labs to see if the anion gap closed and if we need to continue at this point fluid resuscitation  Discussed with pharmacy  If necessary we will start him back on a insulin drip for now n.p.o. until we are sure that the patient does not need an insulin drip.    Hypokalemia- (present on admission)  Assessment & Plan  DKA repeat labs and see if potassium supplementation still needs to be done initial potassium at [facility] was 3.4  Check magnesium    GERD (gastroesophageal reflux disease)- (present on admission)  Assessment & Plan  Continue Protonix    Reactive depression- (present on admission)  Assessment & Plan  Continue with Cymbalta and Lexapro    History of alcohol abuse- (present on admission)  Assessment & Plan  Patient says he used to drink about 5-6 beers per day but quit about a month ago  Monitor for possible withdrawals    Paroxysmal atrial fibrillation (HCC)- (present on admission)  Assessment & Plan  Continue with rate control using Cardizem and anticoagulation using Eliquis        VTE prophylaxis: SCDs/TEDs, Eliquis

## 2024-07-25 NOTE — PROGRESS NOTES
Pt admitted to room 317 via transport in [facility] as direct admit from [facility] at approx 1930.    Patient reports pain at 0 on a scale of 0-10.     AA&Ox4. Denies CP/SOB.  See 2 RN skin note  Denies N/V.  + void. Last BM PTA  Pt ambulates SBA.    Per EMS pt became hypotensive during transport with SBP in 70s and fluid bolus given. BP stable 130/89 upon arrival to unit.     All needs met at this time. Call light within reach. Pt calls appropriately. Bed low and locked, non skid socks in place. Hourly rounding in place.

## 2024-07-25 NOTE — CONSULTS
Critical Care Consultation    Date of consult: 7/25/2024    Referring Physician  Danelle Jimenez D.O.    Reason for Consultation  Euglycemic DKA     History of Presenting Illness  84 y.o. male who presented 7/24/2024  with nausea and vomiting to an outside hospital.  The patient has had a poor appetite for the last several And has not been eating and has not been eating normally.  He has been on Farxiga, it is unclear to me why, as he is On this medication because he is not a diabetic and does not seem to have heart failure.  He was noted to have an elevated anion gap to 25, bicarbonate of 16, and elevated ketones in the hospital.  He was started on low-dose insulin drip and D5 and sent here for ongoing management.    ----------------------------------------------  Chart review from the past 24 hours includes imaging, laboratory studies, vital signs and notes available.  Pertinent data for today's visit includes    Neuro: intact  Cardiac: mildly hypertensive  Pulm: on RA  Heme: stable  /renal: AG 13, bicarb 20 - almost closed  GI: no acute issues   Endo: euglycemic DKA nearly resolved  ID:  no acute issues    I/O: +[phone]    Code Status  Full Code    Review of Systems   Constitutional:  Positive for malaise/fatigue. Negative for chills and fever.   Respiratory:  Negative for cough, shortness of breath and wheezing.    Cardiovascular:  Negative for chest pain and palpitations.   Gastrointestinal:  Positive for nausea and vomiting.   Musculoskeletal:  Negative for myalgias.   Neurological:  Negative for dizziness and headaches.   Psychiatric/Behavioral:  Negative for depression and suicidal ideas.        Past Medical History   has a past medical history of Atrial fibrillation (HCC), Depression, and GERD (gastroesophageal reflux disease).    Surgical History   has no past surgical history on file.    Family History  family history is not on file.    Social History   reports that he has never smoked. He does not  have any smokeless tobacco history on file. He reports that he does not currently use alcohol.    Medications  Home Medications       Reviewed by Casie Sotomayor R.N. (Registered Nurse) on 07/24/24 at 2028  Med List Status: Complete     Medication Last Dose Status   apixaban (ELIQUIS) 5mg Tab 7/24/2024 Active   dapagliflozin or PLACEBO (STUDY DRUG) 10 MG tablet 7/24/2024 Active   dilTIAZem CD (CARDIZEM CD) 240 MG CAPSULE SR 24 HR 7/24/2024 Active   DULoxetine (CYMBALTA) 20 MG Cap DR Particles 7/24/2024 Active   escitalopram (LEXAPRO) 10 MG Tab 7/24/2024 Active   finasteride (PROSCAR) 5 MG Tab 7/24/2024 Active   gabapentin (NEURONTIN) 300 MG Cap 7/24/2024 Active   pantoprazole (PROTONIX) 40 MG Tablet Delayed Response 7/24/2024 Active   potassium chloride (KLOR-CON) 20 MEQ Pack 7/24/2024 Active                  Current Facility-Administered Medications   Medication Dose Route Frequency Provider Last Rate Last Admin    apixaban (Eliquis) tablet 5 mg  5 mg Oral BID Levente Levai, M.D.   5 mg at 07/25/24 0555    DILTIAZem CD (Cardizem CD) capsule 120 mg  120 mg Oral DAILY Levente Levai, M.D.   120 mg at 07/25/24 0554    DULoxetine (Cymbalta) capsule 20 mg  20 mg Oral DAILY Levente Levai, M.D.   20 mg at 07/25/24 0554    escitalopram (Lexapro) tablet 20 mg  20 mg Oral DAILY Levente Levai, M.D.   20 mg at 07/25/24 0555    finasteride (Proscar) tablet 5 mg  5 mg Oral DAILY Levente Levai, M.D.        gabapentin (Neurontin) capsule 300 mg  300 mg Oral DAILY Levente Levai, M.D.   300 mg at 07/25/24 0553    omeprazole (PriLOSEC) capsule 20 mg  20 mg Oral DAILY Levente Levai, M.D.   20 mg at 07/25/24 0556    potassium chloride SA (Kdur) tablet 10 mEq  10 mEq Oral DAILY Levente Levai, M.D.   10 mEq at 07/25/24 0555    lactated ringers infusion   Intravenous Continuous Levente Levai, M.D. 150 mL/hr at 07/25/24 0338 New Bag at 07/25/24 0338    senna-docusate (Pericolace Or Senokot S) 8.6-50 MG per tablet 2 Tablet  2 Tablet Oral Q  EVENING Gigi Levine M.D.   2 Tablet at 07/24/24 2036    And    polyethylene glycol/lytes (Miralax) Packet 1 Packet  1 Packet Oral QDAY PRN Gigi Levine M.D.        labetalol (Normodyne/Trandate) injection 10 mg  10 mg Intravenous Q4HRS PRN Gigi Levine M.D.        ondansetron (Zofran) syringe/vial injection 4 mg  4 mg Intravenous Q4HRS PRN Gigi Levine M.D.        ondansetron (Zofran ODT) dispertab 4 mg  4 mg Oral Q4HRS PRN Gigi Levine M.D.        insulin regular (HumuLIN R,NovoLIN R) injection  2-9 Units Subcutaneous 4X/DAY ACHS Yovani Costa D.O.        And    dextrose 50% (D50W) injection 25 g  25 g Intravenous Q15 MIN PRN Yovani Costa D.O.           Allergies  No Known Allergies    Vital Signs last 24 hours  Temp:  [36.4 °C (97.5 °F)-36.8 °C (98.2 °F)] 36.4 °C (97.5 °F)  Pulse:  [] 95  Resp:  [16-59] 26  BP: ()/() 114/57  SpO2:  [88 %-96 %] 93 %    Physical Exam  Vitals and nursing note reviewed.   Constitutional:       General: He is not in acute distress.     Appearance: Normal appearance.   HENT:      Head: Normocephalic.   Eyes:      Conjunctiva/sclera: Conjunctivae normal.   Cardiovascular:      Rate and Rhythm: Normal rate and regular rhythm.   Pulmonary:      Effort: Pulmonary effort is normal.      Breath sounds: Normal breath sounds.   Abdominal:      General: There is no distension.      Palpations: Abdomen is soft.      Tenderness: There is no abdominal tenderness.   Musculoskeletal:         General: Normal range of motion.      Right lower leg: No edema.      Left lower leg: No edema.   Skin:     General: Skin is warm and dry.   Neurological:      General: No focal deficit present.      Mental Status: He is alert. Mental status is at baseline.   Psychiatric:         Mood and Affect: Mood normal.         Behavior: Behavior normal.       Fluids    Intake/Output Summary (Last 24 hours) at 7/25/2024 0848  Last data filed at 7/25/2024 0600  Gross per 24 hour   Intake  1371.34 ml   Output 950 ml   Net 421.34 ml     Laboratory  Recent Results (from the past 48 hour(s))   POCT glucose device results    Collection Time: 07/24/24  7:41 PM   Result Value Ref Range    POC Glucose, Blood 122 (H) 65 - 99 mg/dL   Magnesium    Collection Time: 07/24/24  8:30 PM   Result Value Ref Range    Magnesium 1.7 1.5 - 2.5 mg/dL   CBC WITHOUT DIFFERENTIAL    Collection Time: 07/24/24  8:30 PM   Result Value Ref Range    WBC 7.9 4.8 - 10.8 K/uL    RBC 4.10 (L) 4.70 - 6.10 M/uL    Hemoglobin 13.8 (L) 14.0 - 18.0 g/dL    Hematocrit 39.5 (L) 42.0 - 52.0 %    MCV 96.3 81.4 - 97.8 fL    MCH 33.7 (H) 27.0 - 33.0 pg    MCHC 34.9 32.3 - 36.5 g/dL    RDW 47.7 35.9 - 50.0 fL    Platelet Count 276 164 - 446 K/uL    MPV 10.0 9.0 - 12.9 fL   Basic Metabolic Panel    Collection Time: 07/24/24  8:30 PM   Result Value Ref Range    Sodium 137 135 - 145 mmol/L    Potassium 3.7 3.6 - 5.5 mmol/L    Chloride 102 96 - 112 mmol/L    Co2 19 (L) 20 - 33 mmol/L    Glucose 117 (H) 65 - 99 mg/dL    Bun 7 (L) 8 - 22 mg/dL    Creatinine 0.64 0.50 - 1.40 mg/dL    Calcium 8.5 8.4 - 10.2 mg/dL    Anion Gap 16.0 7.0 - 16.0   HEMOGLOBIN A1C    Collection Time: 07/24/24  8:30 PM   Result Value Ref Range    Glycohemoglobin 5.2 4.0 - 5.6 %    Est Avg Glucose 103 mg/dL   ESTIMATED GFR    Collection Time: 07/24/24  8:30 PM   Result Value Ref Range    GFR (CKD-EPI) 94 >60 mL/min/1.73 m 2   POCT glucose device results    Collection Time: 07/24/24  8:32 PM   Result Value Ref Range    POC Glucose, Blood 123 (H) 65 - 99 mg/dL   CBC without Differential    Collection Time: 07/25/24  1:45 AM   Result Value Ref Range    WBC 7.7 4.8 - 10.8 K/uL    RBC 3.94 (L) 4.70 - 6.10 M/uL    Hemoglobin 13.4 (L) 14.0 - 18.0 g/dL    Hematocrit 38.0 (L) 42.0 - 52.0 %    MCV 96.4 81.4 - 97.8 fL    MCH 34.0 (H) 27.0 - 33.0 pg    MCHC 35.3 32.3 - 36.5 g/dL    RDW 49.2 35.9 - 50.0 fL    Platelet Count 266 164 - 446 K/uL    MPV 10.0 9.0 - 12.9 fL   Comp Metabolic Panel  (CMP)    Collection Time: 07/25/24  1:45 AM   Result Value Ref Range    Sodium 136 135 - 145 mmol/L    Potassium 3.8 3.6 - 5.5 mmol/L    Chloride 103 96 - 112 mmol/L    Co2 20 20 - 33 mmol/L    Anion Gap 13.0 7.0 - 16.0    Glucose 86 65 - 99 mg/dL    Bun 6 (L) 8 - 22 mg/dL    Creatinine 0.59 0.50 - 1.40 mg/dL    Calcium 8.4 8.4 - 10.2 mg/dL    Correct Calcium 8.9 8.5 - 10.5 mg/dL    AST(SGOT) 16 12 - 45 U/L    ALT(SGPT) 6 2 - 50 U/L    Alkaline Phosphatase 90 30 - 99 U/L    Total Bilirubin 0.7 0.1 - 1.5 mg/dL    Albumin 3.4 3.2 - 4.9 g/dL    Total Protein 5.8 (L) 6.0 - 8.2 g/dL    Globulin 2.4 1.9 - 3.5 g/dL    A-G Ratio 1.4 g/dL   ESTIMATED GFR    Collection Time: 07/25/24  1:45 AM   Result Value Ref Range    GFR (CKD-EPI) 97 >60 mL/min/1.73 m 2   POCT glucose device results    Collection Time: 07/25/24  6:22 AM   Result Value Ref Range    POC Glucose, Blood 97 65 - 99 mg/dL       Imaging  No orders to display     Assessment/Plan  * High anion gap metabolic acidosis- (present on admission)  Assessment & Plan  Suspect that this is euglycemic DKA in a nondiabetic in the setting of Farxiga use    Continue to monitor anion gap  Will continue to use insulin if needed to reduce ketosis  Continue with LR/D5 for resuscitation  Patient will need to hold Farxiga in the future  Continue to monitor electrolytes every 4 hours until anion gap is below 12    GERD (gastroesophageal reflux disease)- (present on admission)  Assessment & Plan  Continue home PPI    Reactive depression- (present on admission)  Assessment & Plan  Continue home Lexapro and Cymbalta    History of alcohol abuse- (present on admission)  Assessment & Plan  Patient reports that he has abstained from drinking for about a month  Will start daily multivitamins    Paroxysmal atrial fibrillation (HCC)- (present on admission)  Assessment & Plan  Continue home apixaban  Continue home Cardizem      Lines: none  Tubes: none  Diet: regular   DVT ppx: eliquis  GI ppx:  PPI  Bowel regiment: yes    Discussed patient condition and risk of morbidity and/or mortality with Hospitalist, RN, RT, Pharmacy, and Patient.      The patient remains critically ill.  Critical care time = 69 minutes in directly providing and coordinating critical care and extensive data review.  No time overlap and excludes procedures.    __________  This note was generated using voice recognition software which has a chance of producing errors of grammar and content.  I have made every reasonable attempt to find and correct any errors, but it should be expected that some may not be found prior to finalization of this note.    Danelle Jimenez, DO   Pulmonary and Critical Care

## 2024-07-25 NOTE — CARE PLAN
The patient is Stable - Low risk of patient condition declining or worsening    Shift Goals  Clinical Goals: monitor blood sugar, labs, vitals  Patient Goals: feel better, go home  Family Goals: ANTONI    Progress made toward(s) clinical / shift goals:      Problem: Knowledge Deficit - Standard  Goal: Patient and family/care givers will demonstrate understanding of plan of care, disease process/condition, diagnostic tests and medications  Outcome: Progressing     Problem: Fall Risk  Goal: Patient will remain free from falls  Outcome: Progressing     Problem: Hemodynamics  Goal: Patient's hemodynamics, fluid balance and neurologic status will be stable or improve  Outcome: Progressing     Problem: Nutrition  Goal: Patient's nutritional and fluid intake will be adequate or improve  Outcome: Progressing       Patient is not progressing towards the following goals:

## 2024-07-25 NOTE — PROGRESS NOTES
Per [facility] ([phone]):    BMP from 12:15 PM:  K 3.4  AG 25  CO2 16    Na 140  Bun 7  Scr 0.75    Pt received:  Ondansetron 4 mg IV 12:24 PM  Labetalol 10 mg IV 12:24 PM  Labetalol 20 mg 12: 50 PM   NS 1L bolus at 13:41 PM  Insulin gtt 1 unit/hr started at 14:23 PM  Labetalol 20 mg at 15:34 PM  FSBG at 16:22 was 71 --> insulin gtt stopped  D50 at 16:35 PM    Zoe Pena, PharmD, BCPS, BCEMP

## 2024-07-25 NOTE — ASSESSMENT & PLAN NOTE
On Eliquis  Resume cardizem 240mg   Metoprolol iv prn with holding parameters  Check TSH and echo  Tele monitoring

## 2024-07-25 NOTE — DIETARY
"Nutrition Services: Initial Assessment  Day 1 of admit.  Pamela Costa is a 84 y.o. male with admitting DX of High anion gap metabolic acidosis, DKA, type 2, not at goal       Consult received for MST of 4 on nutrition screen due to report of 24-33 lb wt loss x 6 months and poor PO PTA     Current hospital problems list:  High anion gap metabolic acidosis  DKA type 2  Hypokalemia  GERD  Reactive depression  Hx of alcohol abuse.  Paroxysmal atrial fibrillation     Nutrition Assessment:   Height: 177.8 cm (5' 10\")  Weight: 89.4 kg (197 lb 1.5 oz)   Body mass index is 28.28 kg/m²., BMI classification: overweight       Objective:     Pertinent labs: 7/24/24: A1c=5.2. POC glucose: 97, 123, 122.    Pertinent meds: SSI, MVI, omeprazole, KCl, senna-docusate   Food Allergies: NKFA  Last BM: PTA  IV fluids: LR @ 150 mL/hour      Current diet order/intake: consistent CHO (diabetic). No documentation of PO intake yet.      Subjective:   Patient reported UBW: 230-240 lb   Dietary recall/ Energy Intake: Pt reports poor PO intake. He was eating 1/2 of normal x 3 months. For five days in June, he did not eat anything. This indicates insufficient energy intake.   Current PO intake is still poor. Per nurse, pt did not eat his breakfast this morning. Lunch was delivered during RD visit. He did not want this either. He agreed to cottage cheese and soft fruit, and Greek yogurt.   Pt said that he has difficulty chewing because he does not have any teeth. Pt reports he is not able to chew apples. Pt may benefit from EC7 diet. He did not want Glucerna supplements.   Nutrition Focused Physical Exam (NFPE)   Weight loss: pt reports losing 20-25 lb (9% - 11%) x 3 months. Wt loss is significant. RD suspects this change related to decreased PO intake  Muscle mass:  Unknown  Subcutaneous fat: Unknown   Fluid Accumulation: No edema noted  Reduced  Strength N/A in acute care setting.        Nutrition Diagnosis: (PES)      Severe " malnutrition in context of acute illness related to poor appetite as evidenced by 9% to 11% wt loss x 3 months and PO intake 50% of normal.       Nutrition Interventions:   Add Greek yogurt to breakfast and dinner trays, and cottage cheese and soft fruit on lunch tray.   Change diet to Level 7-Easy to Chew diet.   Patient aware of active plan of care as appropriate.     Nutrition Monitoring and Evaluation:   Monitor nutrition POC  Additional fluids per MD/DO  Monitor vital signs pertinent to nutrition       RD will follow

## 2024-07-25 NOTE — ASSESSMENT & PLAN NOTE
Patient says he used to drink about 5-6 beers per day but quit about a month ago  Monitor for possible withdrawals

## 2024-07-25 NOTE — ASSESSMENT & PLAN NOTE
Suspect that this is euglycemic DKA in a nondiabetic in the setting of Farxiga use    Continue to monitor anion gap  Will continue to use insulin if needed to reduce ketosis  Continue with LR/D5 for resuscitation  Patient will need to hold Farxiga in the future  Continue to monitor electrolytes every 4 hours until anion gap is below 12

## 2024-07-25 NOTE — PROGRESS NOTES
12-hour chart check complete.    Monitor Summary  Rhythm: afib  Rate: 60-110s  Ectopy: rPVC  Measurements: -/.08/-

## 2024-07-25 NOTE — THERAPY
"Occupational Therapy   Initial Evaluation     Patient Name: Pamela Cosat  Age:  84 y.o., Sex:  male  Medical Record #: [id number]  Today's Date: 7/25/2024          Assessment  Patient is 84 y.o. male presented 7/24/2024 on transfer from outside facility. after he was started on Farxiga and he developed nausea vomiting and went into hyperglycemia with increased anion gap metabolic acidosis.  The patient appeared to be in DKA. Hypokalemia, GERD, History of alcohol abuse.  During OT eval, pt limited mild generalized weakness, decreased activity tolerance and very impaired balance impacting ADL's and ADL transfers.  Pt will benefit from OT services while in house, but do not anticipate need for further post acute therapy upon d/c.  Rec son monitor pt for a couple days upon discharge to ensure safe transition home.  OT will follow while in house.       Plan    Occupational Therapy Initial Treatment Plan   Treatment Interventions: Self Care / Activities of Daily Living, Adaptive Equipment, Neuro Re-Education / Balance, Therapeutic Exercises, Therapeutic Activity, Family / Caregiver Training  Treatment Frequency: 3 Times per Week  Duration: Until Therapy Goals Met    DC Equipment Recommendations: None  Discharge Recommendations: Anticipate that the patient will have no further occupational therapy needs after discharge from the hospital     Subjective    \"I am very careful, and I don't want to fall.  I'd rather use the walker and not take a chance.\"     Objective       07/25/24 1436   Prior Living Situation   Prior Services Home-Independent;Housekeeping / Homemaker Services   Housing / Facility 1 Story House   Steps Into Home 1   Rail Right Rail (Steps into Home)   Bathroom Set up Bathtub / Shower Combination;Shower Chair;Grab Bars   Equipment Owned Front-Wheel Walker;4-Wheel Walker;Single Point Cane;Tub / Shower Seat;Grab Bar(s) In Tub / Shower;Grab Bar(s) By Toilet;Raised Toilet Seat Without Arms;Hand Held " Shower;Reacher  (has tall Toto toilet with bidet)   Lives with - Patient's Self Care Capacity Alone and Able to Care For Self   Comments Son lives about 10 miles away and is supportive and able to assist pt if needed.  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   Prior Level of ADL Function   Self Feeding Independent   Grooming / Hygiene Independent   Bathing Independent   Dressing Independent   Toileting Independent  (wears Depends full time 2/2 incontinence issues)   Prior Level of IADL Function   Medication Management Independent   Laundry Independent   Kitchen Mobility Independent   Finances Independent   Home Management Requires Assist  (housekeeping services)   Shopping Independent   Prior Level Of Mobility Independent Without Device in Home;Independent With Device in Community  (uses 4ww when he goes outside)   Driving / Transportation Driving Independent   Occupation (Pre-Hospital Vocational) Retired Due To Age  (Retired )   Leisure Interests   (time with friends, family  Wrote a book)   History of Falls   History of Falls Yes   Date of Last Fall   (a long while ago)   Vitals   Blood Pressure  125/82   Pulse Oximetry 95 %   O2 Delivery Device None - Room Air   Pain 0 - 10 Group   Therapist Pain Assessment 0;During Activity;Nurse Notified   Cognition    Cognition / Consciousness X   Speech/ Communication Hard of Hearing   Comments Oriented, pleasant, cooperative, attentive   Active ROM Upper Body   Active ROM Upper Body  WDL   Dominant Hand Right   Comments some joint limitations in hands due to years of hard work and various injuries.  He reports he is still able to use them fucntionally   Strength Upper Body   Upper Body Strength  WDL   Coordination Upper Body   Coordination WDL   Balance Assessment   Sitting Balance (Static) Good   Sitting Balance (Dynamic) Fair +   Standing Balance (Static) Fair   Standing Balance (Dynamic) Fair   Weight Shift Sitting Good   Weight Shift Standing  Fair   Comments with FWW (doesn't use at baseline, however he felt more secure with using it)   Bed Mobility    Supine to Sit Modified Independent   Sit to Supine Standby Assist   Scooting Modified Independent   ADL Assessment   Eating Independent   Grooming Modified Independent;Seated   Lower Body Dressing Supervision  (socks, seated EOB)   Toileting   (brief, urinal, declined need to use toilet)   How much help from another person does the patient currently need...   Putting on and taking off regular lower body clothing? 3   Bathing (including washing, rinsing, and drying)? 3   Toileting, which includes using a toilet, bedpan, or urinal? 3   Putting on and taking off regular upper body clothing? 4   Taking care of personal grooming such as brushing teeth? 4   Eating meals? 4   6 Clicks Daily Activity Score 21   Functional Mobility   Sit to Stand Standby Assist   Bed, Chair, Wheelchair Transfer Standby Assist   Transfer Method Stand Step   Mobility SBA with FWW (pt preferred not to ambulate without a walker at this time)   Distance (Feet) 180   # of Times Distance was Traveled 1   Visual Perception   Visual Perception  WDL   Comments glasses, ptosis on R eyelid   Edema / Skin Assessment   Edema / Skin  Not Assessed   Activity Tolerance   Sitting Edge of Bed 5 min x2   Standing 8-9 min   Patient / Family Goals   Patient / Family Goal #1 home when well   Short Term Goals   Short Term Goal # 1 Pt will dress Susan in 2 visits   Short Term Goal # 2 Pt will toilet Susan (elevated seat) in 2 visits   Short Term Goal # 3 Pt will stand 10 min at sink to groom sup in 2 visits   Education Group   Education Provided Role of Occupational Therapist;Activities of Daily Living   Role of Occupational Therapist Patient Response Patient;Acceptance;Explanation;Verbal Demonstration   ADL Patient Response Patient;Acceptance;Explanation;Verbal Demonstration   Additional Comments educ on safety with ADL's, perhaps asking son to stay with  him a day or 2 when he gets home to make sure he transitions well

## 2024-07-26 ENCOUNTER — APPOINTMENT (OUTPATIENT)
Dept: CARDIOLOGY | Facility: MEDICAL CENTER | Age: 84
End: 2024-07-26
Attending: STUDENT IN AN ORGANIZED HEALTH CARE EDUCATION/TRAINING PROGRAM
Payer: MEDICARE

## 2024-07-26 PROBLEM — I48.91 ATRIAL FIBRILLATION WITH RAPID VENTRICULAR RESPONSE (HCC): Status: ACTIVE | Noted: 2024-07-26

## 2024-07-26 PROBLEM — Z71.89 ADVANCE CARE PLANNING: Status: ACTIVE | Noted: 2024-07-26

## 2024-07-26 PROBLEM — E43 SEVERE PROTEIN-CALORIE MALNUTRITION (HCC): Status: ACTIVE | Noted: 2024-07-26

## 2024-07-26 LAB
ANION GAP SERPL CALC-SCNC: 12 MMOL/L (ref 7–16)
B-OH-BUTYR SERPL-MCNC: 0.7 MMOL/L (ref 0.02–0.27)
BUN SERPL-MCNC: 4 MG/DL (ref 8–22)
CALCIUM SERPL-MCNC: 8.5 MG/DL (ref 8.4–10.2)
CHLORIDE SERPL-SCNC: 104 MMOL/L (ref 96–112)
CO2 SERPL-SCNC: 23 MMOL/L (ref 20–33)
CREAT SERPL-MCNC: 0.57 MG/DL (ref 0.5–1.4)
ERYTHROCYTE [DISTWIDTH] IN BLOOD BY AUTOMATED COUNT: 49 FL (ref 35.9–50)
GFR SERPLBLD CREATININE-BSD FMLA CKD-EPI: 98 ML/MIN/1.73 M 2
GLUCOSE SERPL-MCNC: 127 MG/DL (ref 65–99)
HCT VFR BLD AUTO: 39.6 % (ref 42–52)
HGB BLD-MCNC: 13.5 G/DL (ref 14–18)
LV EJECT FRACT  99904: 65
MAGNESIUM SERPL-MCNC: 1.8 MG/DL (ref 1.5–2.5)
MCH RBC QN AUTO: 33 PG (ref 27–33)
MCHC RBC AUTO-ENTMCNC: 34.1 G/DL (ref 32.3–36.5)
MCV RBC AUTO: 96.8 FL (ref 81.4–97.8)
PHOSPHATE SERPL-MCNC: 2.5 MG/DL (ref 2.5–4.5)
PLATELET # BLD AUTO: 216 K/UL (ref 164–446)
PMV BLD AUTO: 10 FL (ref 9–12.9)
POTASSIUM SERPL-SCNC: 3.4 MMOL/L (ref 3.6–5.5)
RBC # BLD AUTO: 4.09 M/UL (ref 4.7–6.1)
SODIUM SERPL-SCNC: 139 MMOL/L (ref 135–145)
TSH SERPL DL<=0.005 MIU/L-ACNC: 2.48 UIU/ML (ref 0.38–5.33)
WBC # BLD AUTO: 6.3 K/UL (ref 4.8–10.8)

## 2024-07-26 PROCEDURE — 84443 ASSAY THYROID STIM HORMONE: CPT

## 2024-07-26 PROCEDURE — A9270 NON-COVERED ITEM OR SERVICE: HCPCS | Performed by: HOSPITALIST

## 2024-07-26 PROCEDURE — 93306 TTE W/DOPPLER COMPLETE: CPT | Mod: 26 | Performed by: INTERNAL MEDICINE

## 2024-07-26 PROCEDURE — 97161 PT EVAL LOW COMPLEX 20 MIN: CPT

## 2024-07-26 PROCEDURE — 93306 TTE W/DOPPLER COMPLETE: CPT

## 2024-07-26 PROCEDURE — 770020 HCHG ROOM/CARE - TELE (206)

## 2024-07-26 PROCEDURE — A9270 NON-COVERED ITEM OR SERVICE: HCPCS | Performed by: STUDENT IN AN ORGANIZED HEALTH CARE EDUCATION/TRAINING PROGRAM

## 2024-07-26 PROCEDURE — 94760 N-INVAS EAR/PLS OXIMETRY 1: CPT

## 2024-07-26 PROCEDURE — 80048 BASIC METABOLIC PNL TOTAL CA: CPT

## 2024-07-26 PROCEDURE — 99233 SBSQ HOSP IP/OBS HIGH 50: CPT | Mod: 25 | Performed by: STUDENT IN AN ORGANIZED HEALTH CARE EDUCATION/TRAINING PROGRAM

## 2024-07-26 PROCEDURE — 93005 ELECTROCARDIOGRAM TRACING: CPT | Performed by: STUDENT IN AN ORGANIZED HEALTH CARE EDUCATION/TRAINING PROGRAM

## 2024-07-26 PROCEDURE — 85027 COMPLETE CBC AUTOMATED: CPT

## 2024-07-26 PROCEDURE — 82010 KETONE BODYS QUAN: CPT

## 2024-07-26 PROCEDURE — 99497 ADVNCD CARE PLAN 30 MIN: CPT | Performed by: STUDENT IN AN ORGANIZED HEALTH CARE EDUCATION/TRAINING PROGRAM

## 2024-07-26 PROCEDURE — 84100 ASSAY OF PHOSPHORUS: CPT

## 2024-07-26 PROCEDURE — 83735 ASSAY OF MAGNESIUM: CPT

## 2024-07-26 PROCEDURE — G0316 PR PROLONGED IP/OBS E&M EA 15 MIN: HCPCS | Performed by: STUDENT IN AN ORGANIZED HEALTH CARE EDUCATION/TRAINING PROGRAM

## 2024-07-26 PROCEDURE — 700102 HCHG RX REV CODE 250 W/ 637 OVERRIDE(OP): Performed by: HOSPITALIST

## 2024-07-26 PROCEDURE — 97535 SELF CARE MNGMENT TRAINING: CPT

## 2024-07-26 PROCEDURE — 700102 HCHG RX REV CODE 250 W/ 637 OVERRIDE(OP): Performed by: INTERNAL MEDICINE

## 2024-07-26 PROCEDURE — A9270 NON-COVERED ITEM OR SERVICE: HCPCS | Performed by: INTERNAL MEDICINE

## 2024-07-26 PROCEDURE — 700102 HCHG RX REV CODE 250 W/ 637 OVERRIDE(OP): Performed by: STUDENT IN AN ORGANIZED HEALTH CARE EDUCATION/TRAINING PROGRAM

## 2024-07-26 RX ORDER — POTASSIUM CHLORIDE 1500 MG/1
40 TABLET, EXTENDED RELEASE ORAL ONCE
Status: COMPLETED | OUTPATIENT
Start: 2024-07-26 | End: 2024-07-26

## 2024-07-26 RX ORDER — DILTIAZEM HYDROCHLORIDE 120 MG/1
120 CAPSULE, COATED, EXTENDED RELEASE ORAL ONCE
Status: COMPLETED | OUTPATIENT
Start: 2024-07-26 | End: 2024-07-26

## 2024-07-26 RX ORDER — DILTIAZEM HYDROCHLORIDE 120 MG/1
240 CAPSULE, COATED, EXTENDED RELEASE ORAL DAILY
Status: DISCONTINUED | OUTPATIENT
Start: 2024-07-27 | End: 2024-07-28 | Stop reason: HOSPADM

## 2024-07-26 RX ORDER — METOPROLOL TARTRATE 1 MG/ML
5 INJECTION, SOLUTION INTRAVENOUS
Status: DISCONTINUED | OUTPATIENT
Start: 2024-07-26 | End: 2024-07-28 | Stop reason: HOSPADM

## 2024-07-26 RX ADMIN — OMEPRAZOLE 20 MG: 20 CAPSULE, DELAYED RELEASE ORAL at 06:26

## 2024-07-26 RX ADMIN — POTASSIUM CHLORIDE 40 MEQ: 1500 TABLET, EXTENDED RELEASE ORAL at 16:29

## 2024-07-26 RX ADMIN — DILTIAZEM HYDROCHLORIDE 120 MG: 120 CAPSULE, COATED, EXTENDED RELEASE ORAL at 06:27

## 2024-07-26 RX ADMIN — MULTIVITAMIN TABLET 1 TABLET: TABLET at 06:26

## 2024-07-26 RX ADMIN — ESCITALOPRAM OXALATE 20 MG: 10 TABLET ORAL at 06:27

## 2024-07-26 RX ADMIN — SENNOSIDES AND DOCUSATE SODIUM 2 TABLET: 50; 8.6 TABLET ORAL at 16:28

## 2024-07-26 RX ADMIN — DILTIAZEM HYDROCHLORIDE 120 MG: 120 CAPSULE, COATED, EXTENDED RELEASE ORAL at 12:41

## 2024-07-26 RX ADMIN — APIXABAN 5 MG: 5 TABLET, FILM COATED ORAL at 16:29

## 2024-07-26 RX ADMIN — APIXABAN 5 MG: 5 TABLET, FILM COATED ORAL at 06:26

## 2024-07-26 RX ADMIN — GABAPENTIN 300 MG: 300 CAPSULE ORAL at 06:26

## 2024-07-26 RX ADMIN — DULOXETINE HYDROCHLORIDE 20 MG: 20 CAPSULE, DELAYED RELEASE ORAL at 06:26

## 2024-07-26 RX ADMIN — POTASSIUM CHLORIDE 10 MEQ: 1500 TABLET, EXTENDED RELEASE ORAL at 06:28

## 2024-07-26 RX ADMIN — FINASTERIDE 5 MG: 5 TABLET, FILM COATED ORAL at 06:26

## 2024-07-26 ASSESSMENT — CHA2DS2 SCORE
VASCULAR DISEASE: NO
AGE 75 OR GREATER: YES
AGE 65 TO 74: NO
CHF OR LEFT VENTRICULAR DYSFUNCTION: YES
CHA2DS2 VASC SCORE: 3
DIABETES: NO
HYPERTENSION: NO
PRIOR STROKE OR TIA OR THROMBOEMBOLISM: NO
SEX: MALE

## 2024-07-26 ASSESSMENT — COGNITIVE AND FUNCTIONAL STATUS - GENERAL
MOBILITY SCORE: 19
DAILY ACTIVITIY SCORE: 21
TURNING FROM BACK TO SIDE WHILE IN FLAT BAD: A LITTLE
DRESSING REGULAR LOWER BODY CLOTHING: A LITTLE
MOVING FROM LYING ON BACK TO SITTING ON SIDE OF FLAT BED: A LITTLE
CLIMB 3 TO 5 STEPS WITH RAILING: A LITTLE
HELP NEEDED FOR BATHING: A LITTLE
MOVING TO AND FROM BED TO CHAIR: A LITTLE
DRESSING REGULAR UPPER BODY CLOTHING: A LITTLE
MOBILITY SCORE: 22
SUGGESTED CMS G CODE MODIFIER MOBILITY: CJ
STANDING UP FROM CHAIR USING ARMS: A LITTLE
WALKING IN HOSPITAL ROOM: A LITTLE
SUGGESTED CMS G CODE MODIFIER MOBILITY: CK
SUGGESTED CMS G CODE MODIFIER DAILY ACTIVITY: CJ
WALKING IN HOSPITAL ROOM: A LITTLE

## 2024-07-26 ASSESSMENT — ENCOUNTER SYMPTOMS
WEAKNESS: 1
VOMITING: 1
NAUSEA: 1

## 2024-07-26 ASSESSMENT — PAIN DESCRIPTION - PAIN TYPE
TYPE: ACUTE PAIN

## 2024-07-26 ASSESSMENT — GAIT ASSESSMENTS
GAIT LEVEL OF ASSIST: SUPERVISED
DEVIATION: ANTALGIC
DISTANCE (FEET): 250
ASSISTIVE DEVICE: FRONT WHEEL WALKER

## 2024-07-26 ASSESSMENT — FIBROSIS 4 INDEX: FIB4 SCORE: 2.48

## 2024-07-26 NOTE — ASSESSMENT & PLAN NOTE
Hx of AFIb on eliquis and cardizem CD 240mg daily. He was started with cardizem CD 120mg daily, now presenting with Afib RVR with -130  Increased cardizem to 240mg daily  Metoprolol iv prn with holding parameters  TSH, echo  Tele   7/27: Echo reviewed LVEF 65%, normal right ventricular systolic function   TSH normal  Continue cardizem CD 240mg and continue Eliquis 5mg bid  Tele monitoring   Patient's cardiologist is in [place] area, I have advised him to follow up with his cardiologist

## 2024-07-26 NOTE — ASSESSMENT & PLAN NOTE
Patient is 84 years old with multiple comorbidities.  Patient is admitted for nausea and vomiting and was found to have euglycemic DKA.  Patient required ICU for insulin drip.  I have discussed goal of care and CODE STATUS with the patient.  Patient has capacity to make medical decision and confirms full code.  Time spent: 16 minutes

## 2024-07-26 NOTE — CARE PLAN
The patient is Stable - Low risk of patient condition declining or worsening    Shift Goals  Clinical Goals: monitor blood sugar  Patient Goals: feel better and eat some food  Family Goals: ANTONI    Progress made toward(s) clinical / shift goals:    Problem: Hemodynamics  Goal: Patient's hemodynamics, fluid balance and neurologic status will be stable or improve  7/25/2024 1841 by MORTEZA BashirNRico  Outcome: Progressing  7/25/2024 1839 by Maggie Mendez R.N.  Outcome: Progressing     Problem: Nutrition  Goal: Patient's nutritional and fluid intake will be adequate or improve  7/25/2024 1841 by MORTEZA BashirN.  Outcome: Progressing  7/25/2024 1839 by Maggie Mendez RCRUZITO.  Outcome: Progressing       Patient is not progressing towards the following goals:

## 2024-07-26 NOTE — PROGRESS NOTES
Hospital Medicine Daily Progress Note    Date of Service  7/26/2024    Chief Complaint  Pamela Costa is a 84 y.o. male admitted 7/24/2024 with nausea and vomiting    Hospital Course  84 y.o. male with hx of AFIb on Eliquis, BPH and depression, who presented 7/24/2024 on transfer from outside facility.  Patient came to us from [facility] where the patient reported to after he was started on Farxiga and he developed nausea vomiting and went into hyperglycemia with increased anion gap metabolic acidosis.  The patient appeared to be in DKA.  His acetone is positive on the outside labs.  His bicarb is down to 16 and his anion gap is 25.  The patient initially was started on fluid resuscitation and insulin drip and was transferred here for evaluation.   Here patient required ICU admission for insulin drip.  Farxiga was discontinued.    Interval Problem Update  Seen patient at bedside  Noted tachycardia with HR up to 130.   EKG ordered  Patient has hx of Afib on Eliquis and cardizem. His home dose  of cardizem was 240mg, however he was started on 120mg cardizem  Will order cardizem 120mg once and start 240mg cardizem tomorrow  Start iv metoprolol iv prn  Check TSH and echo  Labs reviewed K 3.4, replaced  Continue eliquis    I have discussed this patient's plan of care and discharge plan at IDT rounds today with Case Management, Nursing, Nursing leadership, and other members of the IDT team.    Consultants/Specialty  critical care    Code Status  Full Code    Disposition  The patient is not medically cleared for discharge to home or a post-acute facility.      I have placed the appropriate orders for post-discharge needs.    Review of Systems  Review of Systems   Constitutional:  Positive for malaise/fatigue.   Gastrointestinal:  Positive for nausea and vomiting.   Neurological:  Positive for weakness.   All other systems reviewed and are negative.       Physical Exam  Temp:  [36.1 °C (97 °F)-36.9 °C  (98.5 °F)] 36.9 °C (98.5 °F)  Pulse:  [] 133  Resp:  [12-28] 25  BP: ()/() 153/111  SpO2:  [90 %-97 %] 94 %    Physical Exam  Vitals and nursing note reviewed.   Constitutional:       Appearance: Normal appearance.   HENT:      Head: Normocephalic and atraumatic.      Mouth/Throat:      Pharynx: Oropharynx is clear.   Eyes:      Pupils: Pupils are equal, round, and reactive to light.   Neck:      Vascular: No carotid bruit.   Cardiovascular:      Rate and Rhythm: Tachycardia present. Rhythm irregular.   Pulmonary:      Effort: Pulmonary effort is normal.      Breath sounds: Normal breath sounds.   Abdominal:      General: Abdomen is flat. Bowel sounds are normal.      Palpations: Abdomen is soft. There is no mass.   Musculoskeletal:         General: Normal range of motion.      Cervical back: Neck supple.   Skin:     General: Skin is warm and dry.   Neurological:      General: No focal deficit present.      Mental Status: He is alert and oriented to person, place, and time.   Psychiatric:         Mood and Affect: Mood normal.         Behavior: Behavior normal.         Fluids    Intake/Output Summary (Last 24 hours) at 7/26/2024 1539  Last data filed at 7/26/2024 1300  Gross per 24 hour   Intake 841.7 ml   Output 3225 ml   Net -2383.3 ml       Laboratory  Recent Labs     07/24/24  2030 07/25/24  0145 07/26/24  0340   WBC 7.9 7.7 6.3   RBC 4.10* 3.94* 4.09*   HEMOGLOBIN 13.8* 13.4* 13.5*   HEMATOCRIT 39.5* 38.0* 39.6*   MCV 96.3 96.4 96.8   MCH 33.7* 34.0* 33.0   MCHC 34.9 35.3 34.1   RDW 47.7 49.2 49.0   PLATELETCT 276 266 216   MPV 10.0 10.0 10.0     Recent Labs     07/25/24  1518 07/25/24  1810 07/26/24  0340   SODIUM 135 135 139   POTASSIUM 3.4* 3.4* 3.4*   CHLORIDE 100 101 104   CO2 21 23 23   GLUCOSE 125* 107* 127*   BUN 5* 5* 4*   CREATININE 0.56 0.57 0.57   CALCIUM 8.5 8.7 8.5                   Imaging  No orders to display        Assessment/Plan  * High anion gap metabolic acidosis- (present  on admission)  Assessment & Plan  Was initiated on insulin drip and IVF  7/26: AG closed  IVF discontinued  Farxiga discontinued    Severe protein-calorie malnutrition (HCC)  Assessment & Plan  poor appetite and weight loss     Dietician consulted  On protein supplements      Advance care planning  Assessment & Plan  Patient is 84 years old with multiple comorbidities.  Patient is admitted for nausea and vomiting and was found to have euglycemic DKA.  Patient required ICU for insulin drip.  I have discussed goal of care and CODE STATUS with the patient.  Patient has capacity to make medical decision and confirms full code.  Time spent: 16 minutes    Atrial fibrillation with rapid ventricular response (HCC)  Assessment & Plan  Hx of AFIb on eliquis and cardizem CD 240mg daily. He was started with cardizem CD 120mg daily, now presenting with Afib RVR with -130  Increased cardizem to 240mg daily  Metoprolol iv prn with holding parameters  TSH, echo  Tele     Hypokalemia- (present on admission)  Assessment & Plan  DKA repeat labs and see if potassium supplementation still needs to be done initial potassium at outlying facility was 3.4  Check magnesium    GERD (gastroesophageal reflux disease)- (present on admission)  Assessment & Plan  Continue Protonix    Reactive depression- (present on admission)  Assessment & Plan  Continue with Cymbalta and Lexapro    History of alcohol abuse- (present on admission)  Assessment & Plan  Patient says he used to drink about 5-6 beers per day but quit about a month ago  Monitor for possible withdrawals         VTE prophylaxis: Eliquis    I have performed a physical exam and reviewed and updated ROS and Plan today (7/26/2024). In review of yesterday's note (7/25/2024), there are no changes except as documented above.    Patient is has a high medical complexity, complex decision making and is at high risk for complication, morbidity, and mortality.  I spent 69 minutes, reviewing  the chart, obtaining and/or reviewing separately obtained history. Performing a medically appropriate examination and evaluation.  Counseling and educating the patient. Ordering and reviewing medications, tests, or procedures.   Documenting clinical information in EPIC. Independently interpreting results and communicating results to patient. Discussing future disposition of care with patient, RN and case management.  This does not include time spent on separately billable procedures/tests.

## 2024-07-26 NOTE — PROGRESS NOTES
12-hour chart check complete.    Monitor Summary  Rhythm: Afib  Rate:   Ectopy: rPVC  Measurements: -/.08/-

## 2024-07-26 NOTE — CARE PLAN
The patient is Stable - Low risk of patient condition declining or worsening    Shift Goals  Clinical Goals: Monitor blood glucose, labs, I/O, improved oral food intake  Patient Goals: Get better and try to eat  Family Goals: ANTONI    Progress made toward(s) clinical / shift goals:  The patient AG and CO2 improved and the patient was transitioned off the insulin gtt per MD. The patient remains on D10 .45% NS at 50 mL/hr to maintain blood glucose.   Problem: Fall Risk  Goal: Patient will remain free from falls  Outcome: Progressing  Note: The patient remains free from falls at this time and calls appropriately for assistance.      Problem: Pain - Standard  Goal: Alleviation of pain or a reduction in pain to the patient’s comfort goal  Outcome: Progressing  Note: The patient has had zero complaints of pain.      Problem: Hemodynamics  Goal: Patient's hemodynamics, fluid balance and neurologic status will be stable or improve  Outcome: Progressing       Patient is not progressing towards the following goals:      Problem: Nutrition  Goal: Patient's nutritional and fluid intake will be adequate or improve  Outcome: Not Progressing  Note: The patient's oral intake remains inadequate and requires frequent encouragement of food intake.

## 2024-07-26 NOTE — DOCUMENTATION QUERY
[facility]                                                                       Query Response Note      PATIENT:               SARAH HODGES  ACCT #:                  [id number]  MRN:                     [id number]  :                      1940  ADMIT DATE:       2024 7:32 PM  DISCH DATE:          RESPONDING  PROVIDER #:        [id number]           QUERY TEXT:    Please clarify in documentation the relationship, if any, between Farxiga and acidosis    The patient's Clinical Indicators include:  - Findings:  H&P:  High anion gap metabolic acidosis, DKA type 2 not at goal Patient apparently has been and started on Farxiga with the Farxiga developed nausea vomiting.  Patient went into what appears to be early DKA    - Progress note :  High anion gap metabolic acidosis, Suspect that this is euglycemic DKA in a nondiabetic in the setting of Farxiga use.  He is not a diabetic and does not seem to have heart failure     - Treatments:  IVF, insulin drip,  Accu-Checks with sliding scale coverage depending on how the anion gap has improved, hold farxiga, monitor electrolytes, LR/D5 for resuscitation     - Risk factors:  ketosis, farxiga, poor appetite, weight loss, history of alcohol abuse, n/v    Thank You,  Gloria Man RN  Clinical Documentation   [email or web address]  Connect via [email or web address]  Options provided:   -- Acidosis is due to or associated with Farxiga   -- Acidosis is not due to or associated with Farxiga   -- Other explanation, please specify_____   -- Unable to determine      Query created by: Gloria Man on 2024 1:18 PM    RESPONSE TEXT:    Acidosis is due to or associated with Farxiga       QUERY TEXT:    The Registered Dietician assessment/evaluation indicates this patient meets criteria for Severe malnutrition in context of acute illness related to poor appetite as evidenced by 9% to 11% wt  loss x 3 months and PO intake 50% of normal.      Based on your medical judgement, can you please confirm the nutritional status of this patient    Note: If you agree with a diagnosis listed, please remember to include it in your concurrent daily documentation and onto the Discharge Summary.    The patient's Clinical Indicators include:  - Findings:  Registered Dietician evaluation/assessment 7/25:   Severe malnutrition in context of acute illness related to poor appetite as evidenced by 9% to 11% wt loss x 3 months and PO intake 50% of normal.    - Body mass index is 28.28 kg/m²., BMI classification: overweight   - Weight loss: pt reports losing 20-25 lb (9% - 11%) x 3 months. Wt loss is significant. RD suspects this change related to decreased PO intake    - Treatments:  Registered Dietician assessment/evaluation,   Add Greek yogurt to breakfast and dinner trays, and cottage cheese and soft fruit on lunch tray.  Change diet to Level 7-Easy to Chew diet.    - Risk factors:  Poor PO intake, difficulty chewing because he does not have any teeth, weight loss, GERD, history of alcohol abuse, metabolic acidosis     Thank You,  Gloria Man RN  Clinical Documentation   [email or web address]  Connect via [email or web address]  Options provided:   -- Mild protein calorie malnutrition   -- Moderate protein calorie malnutrition   -- Severe protein calorie malnutrition   -- Other explanation, (please specify other explanation)   -- Unable to determine      Query created by: Gloria Man on 7/26/2024 2:49 PM    RESPONSE TEXT:    Severe protein calorie malnutrition          Electronically signed by:  ELMA KATZ MD 7/26/2024 3:52 PM

## 2024-07-26 NOTE — PROGRESS NOTES
Telemetry Shift Summary     Rhythm A Fib  HR Range 115-130  Ectopy o-f PVC, r-o Coup, r Tig  Measurements  -/ 0.10/-  Per strip printed 1600     Normal Values  Rhythm SR  HR Range    Measurements 0.12-0.20 / 0.06-0.10  / 0.30-0.52

## 2024-07-26 NOTE — THERAPY
Physical Therapy   Initial Evaluation     Patient Name: Pamela Costa  Age:  84 y.o., Sex:  male  Medical Record #: [id number]  Today's Date: 7/26/2024          Assessment  Patient is 84 y.o. male who was recently transferred from outside facility for nausea and vomiting secondary to euglycemic DKA and metabolic acidosis. Pt was agreeable to therapy evaluation and presented to PT with impaired balance, impaired gait, and pain. Pt was primarily limited by knee pain, which is chronic. Pt was able to demonstrate Mod I for bed mobility and SPV for all upright mobility with FWW use. Pt was able to navigate steps with no sal LOB and demonstrated safe navigation. Pt is in no acute skilled PT needs at this time, anticipate pt to d/c home once medically clear. Anticipate that the patient will have no further physical therapy needs after discharge from the hospital. Patient will not be actively followed for physical therapy services at this time, however may be seen if requested by physician for 1 more visit within 30 days to address any discharge or equipment needs.    Pt provided with following txt: Pt provided with compensatory strategies for FWW use upon d/c to home to improve gait mechanics and reduce risk of falling. Pt attempting gait without FWW, however, demonstrated with veering and increased antalgic gait. Pt required education and VC for appropriate FWW use and upright posture during use. Pt encouraged to continue mobilizing with [facility] staff while in house to prevent deconditioning.     Plan    Physical Therapy Initial Treatment Plan   Duration: (P) Evaluation only    DC Equipment Recommendations: (P) Front-Wheel Walker  Discharge Recommendations: (P) Anticipate that the patient will have no further physical therapy needs after discharge from the hospital     Objective       07/26/24 1030   Initial Contact Note    Initial Contact Note Order Received and Verified, Physical Therapy Evaluation in Progress with Full  Report to Follow.   Vitals   Pulse (!) 135   Blood Pressure  (!) 149/101   Pain 0 - 10 Group   Therapist Pain Assessment Nurse Notified;0   Prior Living Situation   Prior Services Home-Independent;Housekeeping / Homemaker Services   Housing / Facility 1 Story House   Steps Into Home 1   Steps In Home 0   Rail Both Rail (Steps into Home)   Bathroom Set up Bathtub / Shower Combination   Equipment Owned Front-Wheel Walker;4-Wheel Walker;Single Point Cane   Lives with - Patient's Self Care Capacity Alone and Able to Care For Self   Comments pt states son will come and stay with him for a few days upon d/c to home, otherwise son lives about 10 miles away from him   Prior Level of Functional Mobility   Bed Mobility Independent   Transfer Status Independent   Ambulation Independent   Ambulation Distance   (community)   Assistive Devices Used Single Point Cane   Stairs Independent   Comments reports of intermittent use of SPC due to knee pain   History of Falls   History of Falls Yes   Date of Last Fall   (states of two falls a while ago)   Cognition    Cognition / Consciousness X   Speech/ Communication Hard of Hearing   Comments pleasant/cooperative   Strength Upper Body   Upper Body Strength  WDL   Sensation Upper Body   Upper Extremity Sensation  WDL   Upper Body Muscle Tone   Upper Body Muscle Tone  WDL   Passive ROM Lower Body   Passive ROM Lower Body WDL   Active ROM Lower Body    Active ROM Lower Body  WDL   Strength Lower Body   Lower Body Strength  WDL   Sensation Lower Body   Lower Extremity Sensation   WDL   Lower Body Muscle Tone   Lower Body Muscle Tone  WDL   Neurological Concerns   Neurological Concerns No   Coordination Upper Body   Coordination WDL   Coordination Lower Body    Coordination Lower Body  WDL   Balance Assessment   Sitting Balance (Static) Good   Sitting Balance (Dynamic) Fair +   Standing Balance (Static) Fair +   Standing Balance (Dynamic) Fair +   Weight Shift Sitting Good   Weight Shift  Standing Fair   Comments w/fww use, no sal LOB noted   Bed Mobility    Supine to Sit Modified Independent   Sit to Supine Modified Independent   Scooting Supervised   Comments HOB slightly elevated   Gait Analysis   Gait Level Of Assist Supervised   Assistive Device Front Wheel Walker   Distance (Feet) 250   # of Times Distance was Traveled 1   Deviation Antalgic   # of Stairs Climbed 4   Level of Assist with Stairs Supervised   Weight Bearing Status fwb   Comments attempted ambulation without FWW, however, demonstrates with veering and inc antalgic gait due to knee pain   Functional Mobility   Sit to Stand Supervised   Bed, Chair, Wheelchair Transfer Supervised   Transfer Method Stand Step   Mobility w/fww use   ICU Target Mobility Level   ICU Mobility - Targeted Level Level 4   6 Clicks Assessment - How much HELP from from another person do you currently need... (If the patient hasn't done an activity recently, how much help from another person do you think he/she would need if he/she tried?)   Turning from your back to your side while in a flat bed without using bedrails? 4   Moving from lying on your back to sitting on the side of a flat bed without using bedrails? 4   Moving to and from a bed to a chair (including a wheelchair)? 4   Standing up from a chair using your arms (e.g., wheelchair, or bedside chair)? 4   Walking in hospital room? 3   Climbing 3-5 steps with a railing? 3   6 clicks Mobility Score 22   Activity Tolerance   Sitting in Chair NT   Sitting Edge of Bed 10 mins   Standing 10 mins   Comments no adverse events noted   Edema / Skin Assessment   Edema / Skin  Not Assessed   Education Group   Education Provided Role of Physical Therapist;Use of Assistive Device   Role of Physical Therapist Patient Response Patient;Acceptance;Explanation;Demonstration;Verbal Demonstration;Action Demonstration   Use of Assistive Device Patient Response Patient;Acceptance;Demonstration;Explanation;Verbal  Demonstration;Action Demonstration   Physical Therapy Initial Treatment Plan    Duration Evaluation only   Anticipated Discharge Equipment and Recommendations   DC Equipment Recommendations Front-Wheel Walker   Discharge Recommendations Anticipate that the patient will have no further physical therapy needs after discharge from the hospital   Interdisciplinary Plan of Care Collaboration   IDT Collaboration with  Nursing   Patient Position at End of Therapy In Bed;Call Light within Reach;Tray Table within Reach;Phone within Reach   Collaboration Comments aware of visit and recs   Session Information   Date / Session Number  7/26 eval only

## 2024-07-27 LAB
ANION GAP SERPL CALC-SCNC: 12 MMOL/L (ref 7–16)
BUN SERPL-MCNC: 4 MG/DL (ref 8–22)
CALCIUM SERPL-MCNC: 9 MG/DL (ref 8.4–10.2)
CHLORIDE SERPL-SCNC: 103 MMOL/L (ref 96–112)
CO2 SERPL-SCNC: 23 MMOL/L (ref 20–33)
CREAT SERPL-MCNC: 0.57 MG/DL (ref 0.5–1.4)
EKG IMPRESSION: NORMAL
ERYTHROCYTE [DISTWIDTH] IN BLOOD BY AUTOMATED COUNT: 48.6 FL (ref 35.9–50)
GFR SERPLBLD CREATININE-BSD FMLA CKD-EPI: 98 ML/MIN/1.73 M 2
GLUCOSE SERPL-MCNC: 91 MG/DL (ref 65–99)
HCT VFR BLD AUTO: 41.3 % (ref 42–52)
HGB BLD-MCNC: 14.5 G/DL (ref 14–18)
MAGNESIUM SERPL-MCNC: 1.9 MG/DL (ref 1.5–2.5)
MCH RBC QN AUTO: 33.7 PG (ref 27–33)
MCHC RBC AUTO-ENTMCNC: 35.1 G/DL (ref 32.3–36.5)
MCV RBC AUTO: 96 FL (ref 81.4–97.8)
PHOSPHATE SERPL-MCNC: 3 MG/DL (ref 2.5–4.5)
PLATELET # BLD AUTO: 230 K/UL (ref 164–446)
PMV BLD AUTO: 10.3 FL (ref 9–12.9)
POTASSIUM SERPL-SCNC: 3.6 MMOL/L (ref 3.6–5.5)
RBC # BLD AUTO: 4.3 M/UL (ref 4.7–6.1)
SODIUM SERPL-SCNC: 138 MMOL/L (ref 135–145)
WBC # BLD AUTO: 6.6 K/UL (ref 4.8–10.8)

## 2024-07-27 PROCEDURE — 85027 COMPLETE CBC AUTOMATED: CPT

## 2024-07-27 PROCEDURE — 94760 N-INVAS EAR/PLS OXIMETRY 1: CPT

## 2024-07-27 PROCEDURE — 97112 NEUROMUSCULAR REEDUCATION: CPT

## 2024-07-27 PROCEDURE — 97535 SELF CARE MNGMENT TRAINING: CPT

## 2024-07-27 PROCEDURE — 700102 HCHG RX REV CODE 250 W/ 637 OVERRIDE(OP): Performed by: STUDENT IN AN ORGANIZED HEALTH CARE EDUCATION/TRAINING PROGRAM

## 2024-07-27 PROCEDURE — A9270 NON-COVERED ITEM OR SERVICE: HCPCS | Performed by: INTERNAL MEDICINE

## 2024-07-27 PROCEDURE — 99222 1ST HOSP IP/OBS MODERATE 55: CPT | Performed by: INTERNAL MEDICINE

## 2024-07-27 PROCEDURE — 83735 ASSAY OF MAGNESIUM: CPT

## 2024-07-27 PROCEDURE — 84100 ASSAY OF PHOSPHORUS: CPT

## 2024-07-27 PROCEDURE — 700102 HCHG RX REV CODE 250 W/ 637 OVERRIDE(OP): Performed by: HOSPITALIST

## 2024-07-27 PROCEDURE — 700101 HCHG RX REV CODE 250: Performed by: STUDENT IN AN ORGANIZED HEALTH CARE EDUCATION/TRAINING PROGRAM

## 2024-07-27 PROCEDURE — 93010 ELECTROCARDIOGRAM REPORT: CPT | Performed by: STUDENT IN AN ORGANIZED HEALTH CARE EDUCATION/TRAINING PROGRAM

## 2024-07-27 PROCEDURE — 700102 HCHG RX REV CODE 250 W/ 637 OVERRIDE(OP): Performed by: INTERNAL MEDICINE

## 2024-07-27 PROCEDURE — 80048 BASIC METABOLIC PNL TOTAL CA: CPT

## 2024-07-27 PROCEDURE — A9270 NON-COVERED ITEM OR SERVICE: HCPCS | Performed by: STUDENT IN AN ORGANIZED HEALTH CARE EDUCATION/TRAINING PROGRAM

## 2024-07-27 PROCEDURE — A9270 NON-COVERED ITEM OR SERVICE: HCPCS | Performed by: HOSPITALIST

## 2024-07-27 PROCEDURE — 99233 SBSQ HOSP IP/OBS HIGH 50: CPT | Performed by: STUDENT IN AN ORGANIZED HEALTH CARE EDUCATION/TRAINING PROGRAM

## 2024-07-27 PROCEDURE — 770020 HCHG ROOM/CARE - TELE (206)

## 2024-07-27 RX ADMIN — MULTIVITAMIN TABLET 1 TABLET: TABLET at 05:24

## 2024-07-27 RX ADMIN — METOPROLOL TARTRATE 5 MG: 5 INJECTION INTRAVENOUS at 19:30

## 2024-07-27 RX ADMIN — SENNOSIDES AND DOCUSATE SODIUM 2 TABLET: 50; 8.6 TABLET ORAL at 17:29

## 2024-07-27 RX ADMIN — FINASTERIDE 5 MG: 5 TABLET, FILM COATED ORAL at 05:24

## 2024-07-27 RX ADMIN — POTASSIUM CHLORIDE 10 MEQ: 1500 TABLET, EXTENDED RELEASE ORAL at 05:22

## 2024-07-27 RX ADMIN — APIXABAN 5 MG: 5 TABLET, FILM COATED ORAL at 05:24

## 2024-07-27 RX ADMIN — DILTIAZEM HYDROCHLORIDE 240 MG: 120 CAPSULE, COATED, EXTENDED RELEASE ORAL at 05:25

## 2024-07-27 RX ADMIN — METOPROLOL TARTRATE 5 MG: 5 INJECTION INTRAVENOUS at 05:32

## 2024-07-27 RX ADMIN — OMEPRAZOLE 20 MG: 20 CAPSULE, DELAYED RELEASE ORAL at 05:21

## 2024-07-27 RX ADMIN — GABAPENTIN 300 MG: 300 CAPSULE ORAL at 05:24

## 2024-07-27 RX ADMIN — ESCITALOPRAM OXALATE 20 MG: 10 TABLET ORAL at 05:24

## 2024-07-27 RX ADMIN — DULOXETINE HYDROCHLORIDE 20 MG: 20 CAPSULE, DELAYED RELEASE ORAL at 05:22

## 2024-07-27 RX ADMIN — APIXABAN 5 MG: 5 TABLET, FILM COATED ORAL at 17:29

## 2024-07-27 ASSESSMENT — COGNITIVE AND FUNCTIONAL STATUS - GENERAL
DAILY ACTIVITIY SCORE: 23
HELP NEEDED FOR BATHING: A LITTLE
SUGGESTED CMS G CODE MODIFIER DAILY ACTIVITY: CI

## 2024-07-27 ASSESSMENT — ENCOUNTER SYMPTOMS
WEAKNESS: 1
NAUSEA: 1
VOMITING: 1

## 2024-07-27 ASSESSMENT — PAIN DESCRIPTION - PAIN TYPE
TYPE: ACUTE PAIN
TYPE: ACUTE PAIN

## 2024-07-27 NOTE — PROGRESS NOTES
Received bedside report from morning RN, resumed care of pt. Pt alert and oriented x4, resting comfortably in bed, resp even and unlabored no s/s of resp distress. No complaints at this time, no report of pain/discomfort. Safety measures in place. Call light within reach. Hourly rounding in place.

## 2024-07-27 NOTE — CARE PLAN
The patient is Stable - Low risk of patient condition declining or worsening    Shift Goals  Clinical Goals: Monitor labs, telemetry monitoring  Patient Goals: Feel better, comfort  Family Goals: Wellness    Progress made toward(s) clinical / shift goals:      Problem: Knowledge Deficit - Standard  Goal: Patient and family/care givers will demonstrate understanding of plan of care, disease process/condition, diagnostic tests and medications  Description: Target End Date:  1-3 days or as soon as patient condition allows    Document in Patient Education    1.  Patient and family/caregiver oriented to unit, equipment, visitation policy and means for communicating concern  2.  Complete/review Learning Assessment  3.  Assess knowledge level of disease process/condition, treatment plan, diagnostic tests and medications  4.  Explain disease process/condition, treatment plan, diagnostic tests and medications  Outcome: Progressing  Note: .     Problem: Fall Risk  Goal: Patient will remain free from falls  Description: Target End Date:  Prior to discharge or change in level of care    Document interventions on the Mckennalissett Nguyen Fall Risk Assessment    1.  Assess for fall risk factors  2.  Implement fall precautions  Outcome: Progressing     Problem: Hemodynamics  Goal: Patient's hemodynamics, fluid balance and neurologic status will be stable or improve  Description: Target End Date:  Prior to discharge or change in level of care    Document on Assessment and I/O flowsheet templates    1.  Monitor vital signs, pulse oximetry and cardiac monitor per provider order and/or policy  2.  Maintain blood pressure per provider order  3.  Hemodynamic monitoring per provider order  4.  Manage IV fluids and IV infusions  5.  Monitor intake and output  6.  Daily weights per unit policy or provider order  7.  Assess peripheral pulses and capillary refill  8.  Assess color and body temperature  9.  Position patient for maximum  circulation/cardiac output  10. Monitor for signs/symptoms of excessive bleeding  11. Assess mental status, restlessness and changes in level of consciousness  12. Monitor temperature and report fever or hypothermia to provider immediately. Consideration of targeted temperature management.  Outcome: Progressing     Problem: Nutrition  Goal: Patient's nutritional and fluid intake will be adequate or improve  Description: Target End Date:  Prior to discharge or change in level of care    Document on I/O flowsheet    1.  Monitor nutritional intake  2.  Monitor weight per provider order  3.  Assess patient's ability to take oral nutrition  4.  Collaborate with Speech Therapy, Dietitian and interdisciplinary team for appropriate feeding and fluid intake  5.  Assist with feeding  Outcome: Progressing     Problem: Pain - Standard  Goal: Alleviation of pain or a reduction in pain to the patient’s comfort goal  Description: Target End Date:  Prior to discharge or change in level of care    Document on Vitals flowsheet    1.  Document pain using the appropriate pain scale per order or unit policy  2.  Educate and implement non-pharmacologic comfort measures (i.e. relaxation, distraction, massage, cold/heat therapy, etc.)  3.  Pain management medications as ordered  4.  Reassess pain after pain med administration per policy  5.  If opiods administered assess patient's response to pain medication is appropriate per POSS sedation scale  6.  Follow pain management plan developed in collaboration with patient and interdisciplinary team (including palliative care or pain specialists if applicable)  Outcome: Progressing       Patient is not progressing towards the following goals:

## 2024-07-27 NOTE — CARE PLAN
The patient is Stable - Low risk of patient condition declining or worsening    Shift Goals  Clinical Goals: pt will be free from falls and injury, monitor labs  Patient Goals: rest and comfort  Family Goals: Wellness    Progress made toward(s) clinical / shift goals:  frequent reminder to use call light for assistance, monitor labs.    Patient is not progressing towards the following goals:

## 2024-07-27 NOTE — PROGRESS NOTES
Telemetry Shift Summary     Rhythm A-fib  HR Range   Ectopy r-oPVC  Measurements --/0.08/--           Normal Values  Rhythm SR  HR Range    Measurements 0.12-0.20 / 0.06-0.10  / 0.30-0.52

## 2024-07-27 NOTE — DIETARY
Nutrition Update:    Day 3 of admit.  Pamela Costa is a 84 y.o. male being followed to optimize nutrition.    Current Diet: Level 7 - easy-to-chew with thin liquids, consistent carbohydrate    Met with patient at bedside.  He stated decreased appetite is ongoing but has found some items he will eat such as cottage cheese.  He did agree to try a glucerna supplement today.  He stated he had not tried them in the past and would give it a shot.    Problem: Nutritional:  Goal: Achieve adequate nutritional intake  Description: Patient will consume 50% of meals  Outcome: Progressing      Dietary staff available for ongoing meal snack and supplements preferences.  RD following.

## 2024-07-27 NOTE — PROGRESS NOTES
Hospital Medicine Daily Progress Note    Date of Service  7/27/2024    Chief Complaint  Pamela Costa is a 84 y.o. male admitted 7/24/2024 with nausea and vomiting    Hospital Course  84 y.o. male with hx of AFIb on Eliquis, BPH and depression, who presented 7/24/2024 on transfer from outside facility.  Patient came to us from [facility] where the patient reported to after he was started on Farxiga and he developed nausea vomiting and went into hyperglycemia with increased anion gap metabolic acidosis.  The patient appeared to be in DKA.  His acetone is positive on the outside labs.  His bicarb is down to 16 and his anion gap is 25.  The patient initially was started on fluid resuscitation and insulin drip and was transferred here for evaluation.   Here patient required ICU admission for insulin drip.  Farxiga was discontinued.    Interval Problem Update  Seen patient at bedside  Afib rate was 130 this morning at 0525. Bp 182/139. Patient received iv metoprolol 5mg  Continue cardizem CD 240mg daily  Continue Eliquis  Echo reviewed LVEF 65%, normal right ventricular systolic function   iv metoprolol iv prn  TSH normal   Continue tele monitoring    I have discussed this patient's plan of care and discharge plan at IDT rounds today with Case Management, Nursing, Nursing leadership, and other members of the IDT team.    Consultants/Specialty  critical care    Code Status  Full Code    Disposition  Medically Cleared  I have placed the appropriate orders for post-discharge needs.    Review of Systems  Review of Systems   Constitutional:  Positive for malaise/fatigue.   Gastrointestinal:  Positive for nausea and vomiting.   Neurological:  Positive for weakness.   All other systems reviewed and are negative.       Physical Exam  Temp:  [36.2 °C (97.1 °F)-37 °C (98.6 °F)] 37 °C (98.6 °F)  Pulse:  [] 81  Resp:  [18-25] 20  BP: (116-182)/() 135/91  SpO2:  [91 %-97 %] 97 %    Physical Exam  Vitals  and nursing note reviewed.   Constitutional:       Appearance: Normal appearance.   HENT:      Head: Normocephalic and atraumatic.      Mouth/Throat:      Pharynx: Oropharynx is clear.   Eyes:      Pupils: Pupils are equal, round, and reactive to light.   Neck:      Vascular: No carotid bruit.   Cardiovascular:      Rate and Rhythm: Tachycardia present. Rhythm irregular.   Pulmonary:      Effort: Pulmonary effort is normal.      Breath sounds: Normal breath sounds.   Abdominal:      General: Abdomen is flat. Bowel sounds are normal.      Palpations: Abdomen is soft. There is no mass.   Musculoskeletal:         General: Normal range of motion.      Cervical back: Neck supple.   Skin:     General: Skin is warm and dry.   Neurological:      General: No focal deficit present.      Mental Status: He is alert and oriented to person, place, and time.   Psychiatric:         Mood and Affect: Mood normal.         Behavior: Behavior normal.         Fluids    Intake/Output Summary (Last 24 hours) at 7/27/2024 1250  Last data filed at 7/27/2024 0517  Gross per 24 hour   Intake --   Output 1080 ml   Net -1080 ml       Laboratory  Recent Labs     07/25/24  0145 07/26/24  0340 07/27/24  0204   WBC 7.7 6.3 6.6   RBC 3.94* 4.09* 4.30*   HEMOGLOBIN 13.4* 13.5* 14.5   HEMATOCRIT 38.0* 39.6* 41.3*   MCV 96.4 96.8 96.0   MCH 34.0* 33.0 33.7*   MCHC 35.3 34.1 35.1   RDW 49.2 49.0 48.6   PLATELETCT 266 216 230   MPV 10.0 10.0 10.3     Recent Labs     07/25/24  1810 07/26/24  0340 07/27/24  0204   SODIUM 135 139 138   POTASSIUM 3.4* 3.4* 3.6   CHLORIDE 101 104 103   CO2 23 23 23   GLUCOSE 107* 127* 91   BUN 5* 4* 4*   CREATININE 0.57 0.57 0.57   CALCIUM 8.7 8.5 9.0                   Imaging  EC-ECHOCARDIOGRAM COMPLETE W/O CONT   Final Result           Assessment/Plan  * High anion gap metabolic acidosis- (present on admission)  Assessment & Plan  Was initiated on insulin drip and IVF  7/26: AG closed  IVF discontinued  Farxiga  discontinued    Severe protein-calorie malnutrition (HCC)  Assessment & Plan  poor appetite and weight loss     Dietician consulted  On protein supplements      Advance care planning  Assessment & Plan  Patient is 84 years old with multiple comorbidities.  Patient is admitted for nausea and vomiting and was found to have euglycemic DKA.  Patient required ICU for insulin drip.  I have discussed goal of care and CODE STATUS with the patient.  Patient has capacity to make medical decision and confirms full code.  Time spent: 16 minutes    Atrial fibrillation with rapid ventricular response (HCC)  Assessment & Plan  Hx of AFIb on eliquis and cardizem CD 240mg daily. He was started with cardizem CD 120mg daily, now presenting with Afib RVR with -130  Increased cardizem to 240mg daily  Metoprolol iv prn with holding parameters  TSH, echo  Tele   7/27: Echo reviewed LVEF 65%, normal right ventricular systolic function   TSH normal  Continue cardizem CD 240mg and continue Eliquis 5mg bid  Tele monitoring   Patient's cardiologist is in [place] area, I have advised him to follow up with his cardiologist     Hypokalemia- (present on admission)  Assessment & Plan  DKA repeat labs and see if potassium supplementation still needs to be done initial potassium at outlying facility was 3.4  Check magnesium    GERD (gastroesophageal reflux disease)- (present on admission)  Assessment & Plan  Continue Protonix    Reactive depression- (present on admission)  Assessment & Plan  Continue with Cymbalta and Lexapro    History of alcohol abuse- (present on admission)  Assessment & Plan  Patient says he used to drink about 5-6 beers per day but quit about a month ago  Monitor for possible withdrawals         VTE prophylaxis: Eliquis    I have performed a physical exam and reviewed and updated ROS and Plan today (7/27/2024). In review of yesterday's note (7/26/2024), there are no changes except as documented above.    Patient is has a high  medical complexity, complex decision making and is at high risk for complication, morbidity, and mortality.  I spent 52 minutes, reviewing the chart, obtaining and/or reviewing separately obtained history. Performing a medically appropriate examination and evaluation.  Counseling and educating the patient. Ordering and reviewing medications, tests, or procedures.   Documenting clinical information in EPIC. Independently interpreting results and communicating results to patient. Discussing future disposition of care with patient, RN and case management.  This does not include time spent on separately billable procedures/tests.

## 2024-07-27 NOTE — THERAPY
Occupational Therapy  Daily Treatment     Patient Name: Pamela Costa  Age:  84 y.o., Sex:  male  Medical Record #: [id number]  Today's Date: 7/27/2024     Assessment  Pt is very motivated for ADL activity; performs grooming, toileting, LB dressing. Walks without AD; SBA/Spv. Pt has met established OT goals. Shows good safety. See below for CLOF. Will DC OT at this time. Pt to be with son upon dc.        Plan  DC OT    DC Equipment Recommendations: (P) None  Discharge Recommendations: (P) Anticipate that the patient will have no further occupational therapy needs after discharge from the hospital    Subjective  agreeable     Objective   07/27/24 1222   Cognition    Cognition / Consciousness WDL   Speech/ Communication Hard of Hearing   Level of Consciousness Alert   Balance   Sitting Balance (Static) Good   Sitting Balance (Dynamic) Good   Standing Balance (Static) Good   Standing Balance (Dynamic) Fair +   Weight Shift Sitting Good   Weight Shift Standing Good   Skilled Intervention Verbal Cuing   Bed Mobility    Supine to Sit Modified Independent   Sit to Supine Modified Independent   Scooting Independent   Activities of Daily Living   Eating Independent   Grooming Supervision;Seated  (50%standing/50%seated- oral care, shaving, hairwash/comb)   Upper Body Dressing Independent   Lower Body Dressing Supervision   Toileting Supervision   Skilled Intervention Verbal Cuing   How much help from another person does the patient currently need...   Putting on and taking off regular lower body clothing? 4   Bathing (including washing, rinsing, and drying)? 3   Toileting, which includes using a toilet, bedpan, or urinal? 4   Putting on and taking off regular upper body clothing? 4   Taking care of personal grooming such as brushing teeth? 4   Eating meals? 4   6 Clicks Daily Activity Score 23   Functional Mobility   Sit to Stand Supervised   Bed, Chair, Wheelchair Transfer Supervised   Toilet Transfers Supervised   Transfer  Method Stand Step   Activity Tolerance   Sitting in Chair 1 hr   Sitting Edge of Bed 15   Standing 14   Short Term Goals   Goal Outcome # 1 Goal met   Goal Outcome # 2 Goal met   Goal Outcome # 3 Goal met   Education Group   Education Provided Home Safety   Role of Occupational Therapist Patient Response Patient;Acceptance;Explanation;Eager;Verbal Demonstration   Home Safety Patient Response Patient;Acceptance;Explanation;Verbal Demonstration   ADL Patient Response Patient;Action Demonstration;Eager;Explanation   Occupational Therapy Treatment Plan    O.T. Treatment Plan Modify Current Treatment Plan   Reason For Discharge Discharge Secondary to Goals Met   Anticipated Discharge Equipment and Recommendations   DC Equipment Recommendations None   Discharge Recommendations Anticipate that the patient will have no further occupational therapy needs after discharge from the hospital   Interdisciplinary Plan of Care Collaboration   IDT Collaboration with  Nursing   Patient Position at End of Therapy Seated;Call Light within Reach;Tray Table within Reach;Phone within Reach   Collaboration Comments aware of visit.   Session Information   Date / Session Number  7/27, #2 (2/3, 7/31)   Priority 2   OTHER   Modified Plan Discharge Secondary to Goals Met

## 2024-07-28 VITALS
HEIGHT: 70 IN | RESPIRATION RATE: 18 BRPM | SYSTOLIC BLOOD PRESSURE: 134 MMHG | BODY MASS INDEX: 28.28 KG/M2 | OXYGEN SATURATION: 94 % | WEIGHT: 197.53 LBS | DIASTOLIC BLOOD PRESSURE: 88 MMHG | HEART RATE: 113 BPM | TEMPERATURE: 98.4 F

## 2024-07-28 LAB
ANION GAP SERPL CALC-SCNC: 10 MMOL/L (ref 7–16)
BUN SERPL-MCNC: 7 MG/DL (ref 8–22)
CALCIUM SERPL-MCNC: 9.1 MG/DL (ref 8.4–10.2)
CHLORIDE SERPL-SCNC: 101 MMOL/L (ref 96–112)
CO2 SERPL-SCNC: 26 MMOL/L (ref 20–33)
CREAT SERPL-MCNC: 0.68 MG/DL (ref 0.5–1.4)
ERYTHROCYTE [DISTWIDTH] IN BLOOD BY AUTOMATED COUNT: 48.6 FL (ref 35.9–50)
GFR SERPLBLD CREATININE-BSD FMLA CKD-EPI: 93 ML/MIN/1.73 M 2
GLUCOSE SERPL-MCNC: 94 MG/DL (ref 65–99)
HCT VFR BLD AUTO: 43.6 % (ref 42–52)
HGB BLD-MCNC: 14.6 G/DL (ref 14–18)
MCH RBC QN AUTO: 32.9 PG (ref 27–33)
MCHC RBC AUTO-ENTMCNC: 33.5 G/DL (ref 32.3–36.5)
MCV RBC AUTO: 98.2 FL (ref 81.4–97.8)
PLATELET # BLD AUTO: 250 K/UL (ref 164–446)
PMV BLD AUTO: 10.1 FL (ref 9–12.9)
POTASSIUM SERPL-SCNC: 3.7 MMOL/L (ref 3.6–5.5)
RBC # BLD AUTO: 4.44 M/UL (ref 4.7–6.1)
SODIUM SERPL-SCNC: 137 MMOL/L (ref 135–145)
WBC # BLD AUTO: 7.1 K/UL (ref 4.8–10.8)

## 2024-07-28 PROCEDURE — A9270 NON-COVERED ITEM OR SERVICE: HCPCS | Performed by: HOSPITALIST

## 2024-07-28 PROCEDURE — 700102 HCHG RX REV CODE 250 W/ 637 OVERRIDE(OP): Performed by: INTERNAL MEDICINE

## 2024-07-28 PROCEDURE — A9270 NON-COVERED ITEM OR SERVICE: HCPCS | Performed by: STUDENT IN AN ORGANIZED HEALTH CARE EDUCATION/TRAINING PROGRAM

## 2024-07-28 PROCEDURE — 99239 HOSP IP/OBS DSCHRG MGMT >30: CPT | Performed by: STUDENT IN AN ORGANIZED HEALTH CARE EDUCATION/TRAINING PROGRAM

## 2024-07-28 PROCEDURE — 700102 HCHG RX REV CODE 250 W/ 637 OVERRIDE(OP): Performed by: HOSPITALIST

## 2024-07-28 PROCEDURE — 700102 HCHG RX REV CODE 250 W/ 637 OVERRIDE(OP): Performed by: STUDENT IN AN ORGANIZED HEALTH CARE EDUCATION/TRAINING PROGRAM

## 2024-07-28 PROCEDURE — A9270 NON-COVERED ITEM OR SERVICE: HCPCS | Performed by: INTERNAL MEDICINE

## 2024-07-28 PROCEDURE — 80048 BASIC METABOLIC PNL TOTAL CA: CPT

## 2024-07-28 PROCEDURE — 85027 COMPLETE CBC AUTOMATED: CPT

## 2024-07-28 RX ADMIN — DULOXETINE HYDROCHLORIDE 20 MG: 20 CAPSULE, DELAYED RELEASE ORAL at 06:19

## 2024-07-28 RX ADMIN — MULTIVITAMIN TABLET 1 TABLET: TABLET at 06:20

## 2024-07-28 RX ADMIN — APIXABAN 5 MG: 5 TABLET, FILM COATED ORAL at 06:20

## 2024-07-28 RX ADMIN — GABAPENTIN 300 MG: 300 CAPSULE ORAL at 06:19

## 2024-07-28 RX ADMIN — POTASSIUM CHLORIDE 10 MEQ: 1500 TABLET, EXTENDED RELEASE ORAL at 06:20

## 2024-07-28 RX ADMIN — FINASTERIDE 5 MG: 5 TABLET, FILM COATED ORAL at 06:20

## 2024-07-28 RX ADMIN — ESCITALOPRAM OXALATE 20 MG: 10 TABLET ORAL at 06:20

## 2024-07-28 RX ADMIN — OMEPRAZOLE 20 MG: 20 CAPSULE, DELAYED RELEASE ORAL at 06:19

## 2024-07-28 RX ADMIN — DILTIAZEM HYDROCHLORIDE 240 MG: 120 CAPSULE, COATED, EXTENDED RELEASE ORAL at 06:20

## 2024-07-28 ASSESSMENT — FIBROSIS 4 INDEX: FIB4 SCORE: 2.14

## 2024-07-28 ASSESSMENT — PAIN DESCRIPTION - PAIN TYPE
TYPE: ACUTE PAIN
TYPE: ACUTE PAIN

## 2024-07-28 NOTE — DISCHARGE SUMMARY
Discharge Summary    CHIEF COMPLAINT ON ADMISSION  No chief complaint on file.      Reason for Admission  Euglycemic DKA     Admission Date  7/24/2024    CODE STATUS  Full Code    HPI & HOSPITAL COURSE  This is a 84 y.o. male with hx of AFIb on Eliquis, BPH and depression, who presented 7/24/2024 on transfer from outside facility.  Patient came to us from [facility] where the patient reported to after he was started on Farxiga and he developed nausea vomiting and went into hyperglycemia with increased anion gap metabolic acidosis.  The patient appeared to be in DKA.  His acetone is positive on the outside labs.  His bicarb is down to 16 and his anion gap is 25.  The patient initially was started on fluid resuscitation and insulin drip and was transferred here for evaluation.     Here at our facility, patient required ICU admission for insulin drip.  Farxiga was discontinued. He denies history of diabetes.  Nausea and vomiting have been resolved.  Anion gap was closed.  Patient has been tolerating diet.  No nausea or vomiting or abdominal pain.  He is advised to stop Farxiga on discharge.  He voiced understanding.    Patient has history of A-fib on Cardizem 240 mg daily.  He is noted to have Afib RVR. Cardizem was resumed.  TSH normal. Echo notes LVEF 65%, normal right ventricular systolic function. Cardiology was consulted, recommended to continue Cardizem.  Patient heart rate has been controlled around 100 -110.  Patient is asymptomatic.  I have advised patient to continue Cardizem and Eliquis and outpatient follow-up with his own cardiologist.  He voiced understanding.    Therefore, he is discharged in good and stable condition to home with close outpatient follow-up.    The patient met 2-midnight criteria for an inpatient stay at the time of discharge.    Discharge Date  7/28/24    FOLLOW UP ITEMS POST DISCHARGE  PCP  cardiology    DISCHARGE DIAGNOSES  Principal Problem:    High anion gap  metabolic acidosis (POA: Yes)  Active Problems:    History of alcohol abuse (POA: Yes)    Reactive depression (POA: Yes)    GERD (gastroesophageal reflux disease) (POA: Yes)    Hypokalemia (POA: Yes)    Atrial fibrillation with rapid ventricular response (HCC) (POA: Unknown)    Advance care planning (POA: Unknown)    Severe protein-calorie malnutrition (HCC) (POA: Unknown)  Resolved Problems:    DKA, type 2, not at goal (HCC) (POA: Yes)      FOLLOW UP  No future appointments.  No follow-up provider specified.    MEDICATIONS ON DISCHARGE     Medication List        CONTINUE taking these medications        Instructions   apixaban 5mg Tabs  Commonly known as: Eliquis   Take 5 mg by mouth 2 times a day.  Dose: 5 mg     dilTIAZem  MG Cp24  Commonly known as: Cardizem CD   Take 240 mg by mouth every day.  Dose: 240 mg     DULoxetine 20 MG Cpep  Commonly known as: Cymbalta   Take 20 mg by mouth every day.  Dose: 20 mg     escitalopram 10 MG Tabs  Commonly known as: Lexapro   Take 20 mg by mouth every day.  Dose: 20 mg     finasteride 5 MG Tabs  Commonly known as: Proscar   Take 5 mg by mouth every day.  Dose: 5 mg     gabapentin 300 MG Caps  Commonly known as: Neurontin   Take 300 mg by mouth every day.  Dose: 300 mg     pantoprazole 40 MG Tbec  Commonly known as: Protonix   Take 40 mg by mouth every day.  Dose: 40 mg     potassium chloride 20 MEQ Pack  Commonly known as: Klor-Con   Take 10 mEq by mouth every day.  Dose: 10 mEq              Allergies  No Known Allergies    DIET  Orders Placed This Encounter   Procedures    Diet Order Diet: Level 7 - Easy to Chew; Liquid level: Level 0 - Thin; Second Modifier: (optional): Consistent CHO (Diabetic)     Standing Status:   Standing     Number of Occurrences:   1     Order Specific Question:   Diet:     Answer:   Level 7 - Easy to Chew [22]     Order Specific Question:   Liquid level     Answer:   Level 0 - Thin     Order Specific Question:   Second Modifier: (optional)      Answer:   Consistent CHO (Diabetic) [4]       ACTIVITY  As tolerated.  Weight bearing as tolerated    CONSULTATIONS  ICU  cardiology    PROCEDURES  na    LABORATORY  Lab Results   Component Value Date    SODIUM 137 07/28/2024    POTASSIUM 3.7 07/28/2024    CHLORIDE 101 07/28/2024    CO2 26 07/28/2024    GLUCOSE 94 07/28/2024    BUN 7 (L) 07/28/2024    CREATININE 0.68 07/28/2024        Lab Results   Component Value Date    WBC 7.1 07/28/2024    HEMOGLOBIN 14.6 07/28/2024    HEMATOCRIT 43.6 07/28/2024    PLATELETCT 250 07/28/2024      EC-ECHOCARDIOGRAM COMPLETE W/O CONT   Final Result          Total time of the discharge process 35 minutes.

## 2024-07-28 NOTE — CONSULTS
Cardiology Consult Note    DOS: 7/27/2024    Consulting physician: Calos Mchugh MD    Chief complaint/Reason for consult: AF    HPI:  Pt is an 85 yo M. History of persistent AF. He sees a cardiologist in [place] Dr. Gonzalez. He says AF diagnosed 2.5 years ago. From what I can tell longstanding persistent. Sometimes appears to organize in AFL. Home medications include Eliquis and Dilt. He presented with decreased PO intake and lethargy x 1 week. He says he was recently started on farxiga. He was admitted for ketoacidosis, thought to be related to the new medication. His ventricular rates are borderline and become rapid with activity with PT on the floor though comes down after activity. He is not that symptomatic to this. Says his symptoms have greatly improved while he is here. Currently eating his dinner with no issues.    ROS (+ highlighted in red):  Constitutional: Fevers/chills/fatigue/weightloss  HEENT: Blurry vision/eye pain/sore throat/hearing loss  Respiratory: Shortness of breath/cough  Cardiovascular: Chest pain/palpitations/edema/orthopnea/syncope  GI: Nausea/vomitting/diarrhea  MSK: Arthralgias/myagias/muscle weakness  Skin: Rash/sores  Neurological: Numbness/tremors/vertigo  Endocrine: Excessive thirst/polyuria/cold intolerance/heat intolerance  Psych: Depression/anxiety    Past Medical History:   Diagnosis Date    Atrial fibrillation (HCC)     Depression     GERD (gastroesophageal reflux disease)        History reviewed. No pertinent surgical history.    Social History     Socioeconomic History    Marital status:      Spouse name: Not on file    Number of children: Not on file    Years of education: Not on file    Highest education level: Not on file   Occupational History    Not on file   Tobacco Use    Smoking status: Never    Smokeless tobacco: Not on file   Substance and Sexual Activity    Alcohol use: Not Currently    Drug use: Not on file    Sexual activity: Not on file   Other Topics  Concern    Not on file   Social History Narrative    Not on file     Social Determinants of Health     Financial Resource Strain: Not on file   Food Insecurity: No Food Insecurity (7/24/2024)    Hunger Vital Sign     Worried About Running Out of Food in the Last Year: Never true     Ran Out of Food in the Last Year: Never true   Transportation Needs: No Transportation Needs (7/24/2024)    PRAPARE - Transportation     Lack of Transportation (Medical): No     Lack of Transportation (Non-Medical): No   Physical Activity: Not on file   Stress: Not on file   Social Connections: Not on file   Intimate Partner Violence: Not At Risk (7/24/2024)    Humiliation, Afraid, Rape, and Kick questionnaire     Fear of Current or Ex-Partner: No     Emotionally Abused: No     Physically Abused: No     Sexually Abused: No   Housing Stability: Low Risk  (7/24/2024)    Housing Stability Vital Sign     Unable to Pay for Housing in the Last Year: No     Number of Places Lived in the Last Year: 1     Unstable Housing in the Last Year: No       History reviewed. No pertinent family history.    No Known Allergies    Current Facility-Administered Medications   Medication Dose Route Frequency Provider Last Rate Last Admin    DILTIAZem CD (Cardizem CD) capsule 240 mg  240 mg Oral DAILY Calos Mchugh M.D.   240 mg at 07/27/24 0525    Metoprolol Tartrate (Lopressor) injection 5 mg  5 mg Intravenous Q5 MIN PRN Calos Mchugh M.D.   5 mg at 07/27/24 0532    multivitamin tablet 1 Tablet  1 Tablet Oral DAILY Danelle Jimenez D.O.   1 Tablet at 07/27/24 0524    dextrose 50% (D50W) injection 12.5-25 g  12.5-25 g Intravenous PRN Danelle Jimenez D.O.        apixaban (Eliquis) tablet 5 mg  5 mg Oral BID Gigi Levine M.D.   5 mg at 07/27/24 1729    DULoxetine (Cymbalta) capsule 20 mg  20 mg Oral DAILY Gigi Levine M.D.   20 mg at 07/27/24 0522    escitalopram (Lexapro) tablet 20 mg  20 mg Oral DAILY Gigi Levine M.D.   20 mg at 07/27/24 0524     finasteride (Proscar) tablet 5 mg  5 mg Oral DAILY Levente Levmaylin M.D.   5 mg at 07/27/24 0524    gabapentin (Neurontin) capsule 300 mg  300 mg Oral DAILY Levente Levai, M.D.   300 mg at 07/27/24 0524    omeprazole (PriLOSEC) capsule 20 mg  20 mg Oral DAILY Levente Levmaylin, M.D.   20 mg at 07/27/24 0521    potassium chloride SA (Kdur) tablet 10 mEq  10 mEq Oral DAILY Levente Levmaylin M.D.   10 mEq at 07/27/24 0522    senna-docusate (Pericolace Or Senokot S) 8.6-50 MG per tablet 2 Tablet  2 Tablet Oral Q EVENING Levreggie Levine M.D.   2 Tablet at 07/27/24 1729    And    polyethylene glycol/lytes (Miralax) Packet 1 Packet  1 Packet Oral QDAY PRN Gigi Levine M.D.        labetalol (Normodyne/Trandate) injection 10 mg  10 mg Intravenous Q4HRS PRN Gigi Levine M.D.        ondansetron (Zofran) syringe/vial injection 4 mg  4 mg Intravenous Q4HRS PRN Gigi Levine M.D.        ondansetron (Zofran ODT) dispertab 4 mg  4 mg Oral Q4HRS PRN Gigi Levine M.D.           Physical Exam:  Vitals:    07/27/24 0600 07/27/24 0658 07/27/24 1141 07/27/24 1515   BP:  (!) 157/108 (!) 135/91 (!) 138/99   Pulse: (!) 105 85 81 (!) 109   Resp:  20 20 20   Temp:  36.8 °C (98.3 °F) 37 °C (98.6 °F) 36.5 °C (97.7 °F)   TempSrc:  Oral Oral Oral   SpO2:  93% 97% 96%   Weight:       Height:         General appearance: Elderly NAD, conversant   Eyes: anicteric sclerae, moist conjunctivae; no lid-lag; PERRLA  HENT: Atraumatic; oropharynx clear with moist mucous membranes and no mucosal ulcerations; normal hard and soft palate  Neck: Trachea midline; FROM, supple, no thyromegaly or lymphadenopathy  Lungs: CTA, with normal respiratory effort and no intercostal retractions  CV: irregularly irregular, no MRGs, no JVD   Abdomen: Soft, non-tender; no masses or HSM  Extremities: No peripheral edema or extremity lymphadenopathy  Skin: Normal temperature, turgor and texture; no rash, ulcers or subcutaneous nodules  Psych: Appropriate affect, alert and oriented  to person, place and time    Data:  Labs reviewed:  Mild gap closed and acidosis resolved    Prior echo/stress results reviewed:  Stress 2022 normal perfusion  Preserved LV function on echo here    EKG interpreted by me:   AF    Impression/Plan:  1) Persistent AF  2) DKA  3) Chronic anticoagulation    -His AF seems persistent possibly longstanding  -Does not seem to be all that symptomatic  -Recommend continue his Diltiazem, can titrate to 360 if more rate control desired, but I think symptomatically he is doing fine with lenient rate control (average ventricular rates < 110ish)  -If ventricular rates increase with exertion and come down, and he is symptomatically fine, I do not think this is cause for alarm    Carlos Washington MD

## 2024-07-28 NOTE — PROGRESS NOTES
DC instructions reviewed with patient. IV and telemetry removed. Pending son's arrival for transport home. Walker delivered to patient.

## 2024-07-28 NOTE — PROGRESS NOTES
Telemetry Shift Summary      Rhythm: Afib/Aflutter  HR:   Ectopy: r-o PVC  Measurements: -/.10/-     Normal Values  Rhythm: SR  HR:   Measurements: 0.12-0.20/0.08-0.10/0.30-0.52

## 2024-07-28 NOTE — DISCHARGE INSTRUCTIONS
Discharge Instructions per Calos Mchugh M.D.    Please discontinue Dapagliflozin (Farxiga)  Keep hydration  Follow up with your cardiologist for Afib managements  Follow-up with PCP as outpatient in on e week  Follow up with your eye doctor     Return to ER in the event of new or worsening symptoms. Please note importance of compliance and the patient has agreed to proceed with all medical recommendations and follow up plan indicated above. All medications come with benefits and risks. Risks may include permanent injury or death and these risks can be minimized with close reassessment and monitoring. Please make it to your scheduled follow ups with PCP, cardiology and eye doctor

## 2024-07-28 NOTE — CARE PLAN
The patient is Stable - Low risk of patient condition declining or worsening    Shift Goals  Clinical Goals: HR <125  Patient Goals: Go home  Family Goals: ANTONI    Progress made toward(s) clinical / shift goals: The patient required 5 mg of metoprolol at the start of shift for sustaining a HR in the 130's. Patient has not required any further PRNs for HR at this time.    Problem: Nutrition  Goal: Patient's nutritional and fluid intake will be adequate or improve  Outcome: Progressing  Note: Patient has been able to eat 25-50% of all meals, and 50-75% of Boost/Ensure. Patient also has enjoyed eating snacks such as cottage cheese and fruit.     Problem: Pain - Standard  Goal: Alleviation of pain or a reduction in pain to the patient’s comfort goal  Outcome: Met  Note: The patient has remained pain free during admission        Patient is not progressing towards the following goals:

## 2024-07-28 NOTE — CARE PLAN
The patient is Stable - Low risk of patient condition declining or worsening    Shift Goals  Clinical Goals: Medical clearance  Patient Goals: Discharge  Family Goals: ANTONI    Progress made toward(s) clinical / shift goals:    Problem: Knowledge Deficit - Standard  Goal: Patient and family/care givers will demonstrate understanding of plan of care, disease process/condition, diagnostic tests and medications  Outcome: Progressing  Note: Patient's knowledge of plan of care, diagnostics, and medications regularly assessed. RN answers patient questions appropriately, education provided. Patient verbalizes better understanding of their condition.       Problem: Communication  Goal: The ability to communicate needs accurately and effectively will improve  Outcome: Progressing  Flowsheets (Taken 7/28/2024 7193)  Communication:   Assessed patient's ability to understand and communicate   Oriented patient to call light   Oriented family/support system to call light   Reoriented patient to environment       Patient is not progressing towards the following goals:

## 2024-07-28 NOTE — PROGRESS NOTES
Telemetry Shift Summary    Rhythm Afib   HR Range   Ectopy r-o PVC, rcoup  Measurement -/. 10/-    Normal Values  Rhythm SR  HR Range   Measurement 0.12-.020 / 0.06-0.10 / 0.30-0.52

## 2024-12-23 NOTE — ASSESSMENT & PLAN NOTE
Patient apparently has been and started on Farxiga with the Farxiga developed nausea vomiting  Patient went into what appears to be early DKA  At the treating facility they started him on insulin drip and fluids [facility]  Will recheck labs to see if the anion gap closed and if we need to continue at this point fluid resuscitation  Discussed with pharmacy  If necessary we will start him back on a insulin drip for now n.p.o. until we are sure that the patient does not need an insulin drip.   23-Dec-2024 00:51

## 2025-07-16 PROBLEM — E13.10 SECONDARY DM WITH DKA (HCC): Status: ACTIVE | Noted: 2025-07-16

## 2025-07-17 ENCOUNTER — HOSPITAL ENCOUNTER (INPATIENT)
Facility: MEDICAL CENTER | Age: 85
LOS: 2 days | DRG: 641 | End: 2025-07-19
Attending: STUDENT IN AN ORGANIZED HEALTH CARE EDUCATION/TRAINING PROGRAM | Admitting: INTERNAL MEDICINE
Payer: MEDICARE

## 2025-07-17 ENCOUNTER — HOSPITAL ENCOUNTER (OUTPATIENT)
Dept: RADIOLOGY | Facility: MEDICAL CENTER | Age: 85
End: 2025-07-17

## 2025-07-17 DIAGNOSIS — I48.91 ATRIAL FIBRILLATION WITH RAPID VENTRICULAR RESPONSE (HCC): Primary | ICD-10-CM

## 2025-07-17 PROBLEM — E43 SEVERE PROTEIN-CALORIE MALNUTRITION (HCC): Status: RESOLVED | Noted: 2024-07-26 | Resolved: 2025-07-17

## 2025-07-17 PROBLEM — R11.2 NAUSEA & VOMITING: Status: ACTIVE | Noted: 2025-07-17

## 2025-07-17 PROBLEM — E13.10 SECONDARY DM WITH DKA (HCC): Status: RESOLVED | Noted: 2025-07-16 | Resolved: 2025-07-17

## 2025-07-17 PROBLEM — F10.11 HISTORY OF ALCOHOL ABUSE: Status: RESOLVED | Noted: 2024-07-24 | Resolved: 2025-07-17

## 2025-07-17 PROBLEM — I10 SEVERE HYPERTENSION: Status: ACTIVE | Noted: 2025-07-17

## 2025-07-17 PROBLEM — E87.20 LACTIC ACIDOSIS: Status: ACTIVE | Noted: 2025-07-17

## 2025-07-17 PROBLEM — E87.6 HYPOKALEMIA: Status: RESOLVED | Noted: 2024-07-24 | Resolved: 2025-07-17

## 2025-07-17 PROBLEM — G62.9 NEUROPATHY: Status: ACTIVE | Noted: 2025-07-17

## 2025-07-17 LAB
ALBUMIN SERPL BCP-MCNC: 4.6 G/DL (ref 3.2–4.9)
ALBUMIN/GLOB SERPL: 1.6 G/DL
ALP SERPL-CCNC: 94 U/L (ref 30–99)
ALT SERPL-CCNC: 9 U/L (ref 2–50)
ANION GAP SERPL CALC-SCNC: 15 MMOL/L (ref 7–16)
AST SERPL-CCNC: 22 U/L (ref 12–45)
B-OH-BUTYR SERPL-MCNC: 0.71 MMOL/L (ref 0.02–0.27)
BASOPHILS # BLD AUTO: 0.4 % (ref 0–1.8)
BASOPHILS # BLD: 0.06 K/UL (ref 0–0.12)
BILIRUB SERPL-MCNC: 1 MG/DL (ref 0.1–1.5)
BUN SERPL-MCNC: 11 MG/DL (ref 8–22)
CALCIUM ALBUM COR SERPL-MCNC: 8.7 MG/DL (ref 8.5–10.5)
CALCIUM SERPL-MCNC: 9.2 MG/DL (ref 8.5–10.5)
CHLORIDE SERPL-SCNC: 101 MMOL/L (ref 96–112)
CO2 SERPL-SCNC: 20 MMOL/L (ref 20–33)
CREAT SERPL-MCNC: 0.82 MG/DL (ref 0.5–1.4)
EOSINOPHIL # BLD AUTO: 0.01 K/UL (ref 0–0.51)
EOSINOPHIL NFR BLD: 0.1 % (ref 0–6.9)
ERYTHROCYTE [DISTWIDTH] IN BLOOD BY AUTOMATED COUNT: 47.8 FL (ref 35.9–50)
GFR SERPLBLD CREATININE-BSD FMLA CKD-EPI: 87 ML/MIN/1.73 M 2
GLOBULIN SER CALC-MCNC: 2.8 G/DL (ref 1.9–3.5)
GLUCOSE SERPL-MCNC: 172 MG/DL (ref 65–99)
HCT VFR BLD AUTO: 46.1 % (ref 42–52)
HGB BLD-MCNC: 15.8 G/DL (ref 14–18)
IMM GRANULOCYTES # BLD AUTO: 0.07 K/UL (ref 0–0.11)
IMM GRANULOCYTES NFR BLD AUTO: 0.5 % (ref 0–0.9)
LACTATE SERPL-SCNC: 1.4 MMOL/L (ref 0.5–2)
LACTATE SERPL-SCNC: 3.2 MMOL/L (ref 0.5–2)
LIPASE SERPL-CCNC: 21 U/L (ref 11–82)
LYMPHOCYTES # BLD AUTO: 0.65 K/UL (ref 1–4.8)
LYMPHOCYTES NFR BLD: 4.9 % (ref 22–41)
MCH RBC QN AUTO: 32.6 PG (ref 27–33)
MCHC RBC AUTO-ENTMCNC: 34.3 G/DL (ref 32.3–36.5)
MCV RBC AUTO: 95.1 FL (ref 81.4–97.8)
MONOCYTES # BLD AUTO: 0.6 K/UL (ref 0–0.85)
MONOCYTES NFR BLD AUTO: 4.5 % (ref 0–13.4)
NEUTROPHILS # BLD AUTO: 11.98 K/UL (ref 1.82–7.42)
NEUTROPHILS NFR BLD: 89.6 % (ref 44–72)
NRBC # BLD AUTO: 0 K/UL
NRBC BLD-RTO: 0 /100 WBC (ref 0–0.2)
NT-PROBNP SERPL IA-MCNC: 3933 PG/ML (ref 0–125)
PLATELET # BLD AUTO: 283 K/UL (ref 164–446)
PMV BLD AUTO: 9.7 FL (ref 9–12.9)
POTASSIUM SERPL-SCNC: 3.4 MMOL/L (ref 3.6–5.5)
PROT SERPL-MCNC: 7.4 G/DL (ref 6–8.2)
RBC # BLD AUTO: 4.85 M/UL (ref 4.7–6.1)
SODIUM SERPL-SCNC: 136 MMOL/L (ref 135–145)
TROPONIN T SERPL-MCNC: 20 NG/L (ref 6–19)
TSH SERPL DL<=0.005 MIU/L-ACNC: 2.66 UIU/ML (ref 0.38–5.33)
WBC # BLD AUTO: 13.4 K/UL (ref 4.8–10.8)

## 2025-07-17 PROCEDURE — 82010 KETONE BODYS QUAN: CPT

## 2025-07-17 PROCEDURE — 87040 BLOOD CULTURE FOR BACTERIA: CPT | Mod: 91

## 2025-07-17 PROCEDURE — 700101 HCHG RX REV CODE 250: Performed by: STUDENT IN AN ORGANIZED HEALTH CARE EDUCATION/TRAINING PROGRAM

## 2025-07-17 PROCEDURE — 700111 HCHG RX REV CODE 636 W/ 250 OVERRIDE (IP): Performed by: STUDENT IN AN ORGANIZED HEALTH CARE EDUCATION/TRAINING PROGRAM

## 2025-07-17 PROCEDURE — 770020 HCHG ROOM/CARE - TELE (206)

## 2025-07-17 PROCEDURE — 700102 HCHG RX REV CODE 250 W/ 637 OVERRIDE(OP): Mod: JZ | Performed by: INTERNAL MEDICINE

## 2025-07-17 PROCEDURE — 700102 HCHG RX REV CODE 250 W/ 637 OVERRIDE(OP): Performed by: STUDENT IN AN ORGANIZED HEALTH CARE EDUCATION/TRAINING PROGRAM

## 2025-07-17 PROCEDURE — A9270 NON-COVERED ITEM OR SERVICE: HCPCS | Mod: JZ | Performed by: INTERNAL MEDICINE

## 2025-07-17 PROCEDURE — 99291 CRITICAL CARE FIRST HOUR: CPT | Performed by: STUDENT IN AN ORGANIZED HEALTH CARE EDUCATION/TRAINING PROGRAM

## 2025-07-17 PROCEDURE — 85025 COMPLETE CBC W/AUTO DIFF WBC: CPT

## 2025-07-17 PROCEDURE — 700105 HCHG RX REV CODE 258: Performed by: STUDENT IN AN ORGANIZED HEALTH CARE EDUCATION/TRAINING PROGRAM

## 2025-07-17 PROCEDURE — 84443 ASSAY THYROID STIM HORMONE: CPT

## 2025-07-17 PROCEDURE — 84484 ASSAY OF TROPONIN QUANT: CPT

## 2025-07-17 PROCEDURE — 99223 1ST HOSP IP/OBS HIGH 75: CPT | Performed by: INTERNAL MEDICINE

## 2025-07-17 PROCEDURE — 83690 ASSAY OF LIPASE: CPT

## 2025-07-17 PROCEDURE — A9270 NON-COVERED ITEM OR SERVICE: HCPCS | Performed by: STUDENT IN AN ORGANIZED HEALTH CARE EDUCATION/TRAINING PROGRAM

## 2025-07-17 PROCEDURE — 83605 ASSAY OF LACTIC ACID: CPT

## 2025-07-17 PROCEDURE — 83880 ASSAY OF NATRIURETIC PEPTIDE: CPT

## 2025-07-17 PROCEDURE — 80053 COMPREHEN METABOLIC PANEL: CPT

## 2025-07-17 RX ORDER — SIMVASTATIN 40 MG
40 TABLET ORAL
Status: DISCONTINUED | OUTPATIENT
Start: 2025-07-17 | End: 2025-07-19 | Stop reason: HOSPADM

## 2025-07-17 RX ORDER — GABAPENTIN 300 MG/1
300 CAPSULE ORAL 3 TIMES DAILY
Status: DISCONTINUED | OUTPATIENT
Start: 2025-07-17 | End: 2025-07-19 | Stop reason: HOSPADM

## 2025-07-17 RX ORDER — GABAPENTIN 300 MG/1
300 CAPSULE ORAL DAILY
Status: DISCONTINUED | OUTPATIENT
Start: 2025-07-17 | End: 2025-07-17

## 2025-07-17 RX ORDER — DILTIAZEM HYDROCHLORIDE 240 MG/1
240 CAPSULE, COATED, EXTENDED RELEASE ORAL DAILY
Status: DISCONTINUED | OUTPATIENT
Start: 2025-07-17 | End: 2025-07-17

## 2025-07-17 RX ORDER — ONDANSETRON 2 MG/ML
4 INJECTION INTRAMUSCULAR; INTRAVENOUS EVERY 4 HOURS PRN
Status: DISCONTINUED | OUTPATIENT
Start: 2025-07-17 | End: 2025-07-19 | Stop reason: HOSPADM

## 2025-07-17 RX ORDER — ACETAMINOPHEN 325 MG/1
650 TABLET ORAL EVERY 6 HOURS PRN
Status: DISCONTINUED | OUTPATIENT
Start: 2025-07-17 | End: 2025-07-19 | Stop reason: HOSPADM

## 2025-07-17 RX ORDER — EZETIMIBE 10 MG/1
10 TABLET ORAL
Status: DISCONTINUED | OUTPATIENT
Start: 2025-07-17 | End: 2025-07-19 | Stop reason: HOSPADM

## 2025-07-17 RX ORDER — EZETIMIBE AND SIMVASTATIN 10; 40 MG/1; MG/1
1 TABLET ORAL NIGHTLY
COMMUNITY

## 2025-07-17 RX ORDER — ESCITALOPRAM OXALATE 10 MG/1
20 TABLET ORAL DAILY
Status: DISCONTINUED | OUTPATIENT
Start: 2025-07-17 | End: 2025-07-19 | Stop reason: HOSPADM

## 2025-07-17 RX ORDER — HYDRALAZINE HYDROCHLORIDE 20 MG/ML
20 INJECTION INTRAMUSCULAR; INTRAVENOUS EVERY 6 HOURS PRN
Status: DISCONTINUED | OUTPATIENT
Start: 2025-07-17 | End: 2025-07-19 | Stop reason: HOSPADM

## 2025-07-17 RX ORDER — DAPAGLIFLOZIN 10 MG/1
10 TABLET, FILM COATED ORAL DAILY
Status: ON HOLD | COMMUNITY
End: 2025-07-17

## 2025-07-17 RX ORDER — DULOXETIN HYDROCHLORIDE 20 MG/1
20 CAPSULE, DELAYED RELEASE ORAL DAILY
Status: DISCONTINUED | OUTPATIENT
Start: 2025-07-17 | End: 2025-07-19 | Stop reason: HOSPADM

## 2025-07-17 RX ORDER — POLYETHYLENE GLYCOL 3350 17 G/17G
1 POWDER, FOR SOLUTION ORAL
Status: DISCONTINUED | OUTPATIENT
Start: 2025-07-17 | End: 2025-07-19 | Stop reason: HOSPADM

## 2025-07-17 RX ORDER — LABETALOL HYDROCHLORIDE 5 MG/ML
10-20 INJECTION, SOLUTION INTRAVENOUS
Status: DISCONTINUED | OUTPATIENT
Start: 2025-07-17 | End: 2025-07-19 | Stop reason: HOSPADM

## 2025-07-17 RX ORDER — POTASSIUM CHLORIDE 1500 MG/1
40 TABLET, EXTENDED RELEASE ORAL ONCE
Status: COMPLETED | OUTPATIENT
Start: 2025-07-17 | End: 2025-07-17

## 2025-07-17 RX ORDER — METOPROLOL SUCCINATE 25 MG/1
25 TABLET, EXTENDED RELEASE ORAL DAILY
Status: ON HOLD | COMMUNITY
End: 2025-07-19

## 2025-07-17 RX ORDER — OMEPRAZOLE 20 MG/1
20 CAPSULE, DELAYED RELEASE ORAL DAILY
Status: DISCONTINUED | OUTPATIENT
Start: 2025-07-17 | End: 2025-07-19 | Stop reason: HOSPADM

## 2025-07-17 RX ORDER — POTASSIUM CHLORIDE 750 MG/1
10 TABLET, EXTENDED RELEASE ORAL DAILY
COMMUNITY

## 2025-07-17 RX ORDER — AMOXICILLIN 250 MG
2 CAPSULE ORAL EVERY EVENING
Status: DISCONTINUED | OUTPATIENT
Start: 2025-07-17 | End: 2025-07-19 | Stop reason: HOSPADM

## 2025-07-17 RX ORDER — EZETIMIBE AND SIMVASTATIN 10; 40 MG/1; MG/1
1 TABLET ORAL NIGHTLY
Status: DISCONTINUED | OUTPATIENT
Start: 2025-07-17 | End: 2025-07-17

## 2025-07-17 RX ORDER — LABETALOL HYDROCHLORIDE 5 MG/ML
10 INJECTION, SOLUTION INTRAVENOUS
Status: DISCONTINUED | OUTPATIENT
Start: 2025-07-17 | End: 2025-07-17

## 2025-07-17 RX ORDER — METOPROLOL SUCCINATE 25 MG/1
25 TABLET, EXTENDED RELEASE ORAL DAILY
Status: DISCONTINUED | OUTPATIENT
Start: 2025-07-17 | End: 2025-07-18

## 2025-07-17 RX ORDER — PANTOPRAZOLE SODIUM 40 MG/1
40 TABLET, DELAYED RELEASE ORAL DAILY
Status: DISCONTINUED | OUTPATIENT
Start: 2025-07-17 | End: 2025-07-17

## 2025-07-17 RX ADMIN — ESCITALOPRAM OXALATE 20 MG: 10 TABLET ORAL at 06:07

## 2025-07-17 RX ADMIN — POTASSIUM CHLORIDE 40 MEQ: 1500 TABLET, EXTENDED RELEASE ORAL at 14:24

## 2025-07-17 RX ADMIN — GABAPENTIN 300 MG: 300 CAPSULE ORAL at 11:55

## 2025-07-17 RX ADMIN — GABAPENTIN 300 MG: 300 CAPSULE ORAL at 06:08

## 2025-07-17 RX ADMIN — APIXABAN 5 MG: 5 TABLET, FILM COATED ORAL at 09:54

## 2025-07-17 RX ADMIN — APIXABAN 5 MG: 5 TABLET, FILM COATED ORAL at 17:13

## 2025-07-17 RX ADMIN — GABAPENTIN 300 MG: 300 CAPSULE ORAL at 17:13

## 2025-07-17 RX ADMIN — OMEPRAZOLE 20 MG: 20 CAPSULE, DELAYED RELEASE ORAL at 06:07

## 2025-07-17 RX ADMIN — DULOXETINE 20 MG: 20 CAPSULE, DELAYED RELEASE ORAL at 07:04

## 2025-07-17 RX ADMIN — METOPROLOL SUCCINATE 25 MG: 50 TABLET, EXTENDED RELEASE ORAL at 06:07

## 2025-07-17 RX ADMIN — EZETIMIBE 10 MG: 10 TABLET ORAL at 21:04

## 2025-07-17 RX ADMIN — LABETALOL HYDROCHLORIDE 10 MG: 5 INJECTION, SOLUTION INTRAVENOUS at 02:35

## 2025-07-17 RX ADMIN — DILTIAZEM HYDROCHLORIDE 240 MG: 240 CAPSULE, EXTENDED RELEASE ORAL at 07:04

## 2025-07-17 RX ADMIN — SODIUM CHLORIDE 5 MG/HR: 9 INJECTION, SOLUTION INTRAVENOUS at 02:26

## 2025-07-17 RX ADMIN — SIMVASTATIN 40 MG: 40 TABLET, FILM COATED ORAL at 21:04

## 2025-07-17 ASSESSMENT — ENCOUNTER SYMPTOMS
SPUTUM PRODUCTION: 0
EYE PAIN: 0
FEVER: 0
DIARRHEA: 0
VOMITING: 0
TREMORS: 0
NAUSEA: 0
MYALGIAS: 0
DOUBLE VISION: 0
SENSORY CHANGE: 0
PALPITATIONS: 0
HALLUCINATIONS: 0
FOCAL WEAKNESS: 0
BLURRED VISION: 0
COUGH: 0
CHILLS: 0
ORTHOPNEA: 0
ABDOMINAL PAIN: 0
NECK PAIN: 0
BACK PAIN: 0
PHOTOPHOBIA: 0
CONSTIPATION: 0
TINGLING: 0
SHORTNESS OF BREATH: 0
WEIGHT LOSS: 0
HEADACHES: 0
SPEECH CHANGE: 0
DIZZINESS: 0

## 2025-07-17 ASSESSMENT — CHA2DS2 SCORE
AGE 75 OR GREATER: YES
AGE 65 TO 74: NO
DIABETES: NO
PRIOR STROKE OR TIA OR THROMBOEMBOLISM: NO
CHA2DS2 VASC SCORE: 3
CHF OR LEFT VENTRICULAR DYSFUNCTION: NO
HYPERTENSION: YES
SEX: MALE
VASCULAR DISEASE: NO

## 2025-07-17 ASSESSMENT — PAIN DESCRIPTION - PAIN TYPE
TYPE: ACUTE PAIN

## 2025-07-17 ASSESSMENT — FIBROSIS 4 INDEX: FIB4 SCORE: 2.17

## 2025-07-17 ASSESSMENT — LIFESTYLE VARIABLES: SUBSTANCE_ABUSE: 0

## 2025-07-17 NOTE — CONSULTS
Hospital Medicine Consultation    Date of Service  7/17/2025    Referring Physician    Thu Tavares M.D.     Consulting Physician  Cassie Whitley M.D.    Reason for Consultation  Transfer of care    History of Presenting Illness    85 y.o. male with past medical history of hypertension, atrial fibrillation on Eliquis, congestive heart failure, hyperlipidemia, prostate cancer and depression who presented to the hospital on 7/17/2025 as a transfer from [place] emergency department with 2 days of persistent nausea and vomiting and found to have severe hypertension, atrial fibrillation with rapid ventricular response and labs concerning for euglycemic DKA versus starvation ketosis.  Patient received 2 L of IV fluid bolus and 5 units of subcu insulin and dose of IV labetalol.  Patient transferred to [facility] for higher level of care.  Patient did not require IV insulin therapies and he found to have beta-hydroxybutyrate of 0.7 and he was placed briefly on nicardipine infusion.  Patient overall symptoms and lab workup showed significant improvement.    On July 17, 2025 intensivist Dr. Tavares requested to transfer care to hospitalist service.    I evaluated and examined him at the bedside.  He reported that he is feeling significantly better denies complaint of nausea and vomiting.  I discussed plan of care with him and answered all his questions.      Review of Systems  Review of Systems   Constitutional:  Positive for malaise/fatigue. Negative for chills, fever and weight loss.   HENT:  Negative for hearing loss and tinnitus.    Eyes:  Negative for blurred vision, double vision, photophobia and pain.   Respiratory:  Negative for cough, sputum production and shortness of breath.    Cardiovascular:  Negative for chest pain, palpitations, orthopnea and leg swelling.   Gastrointestinal:  Negative for abdominal pain, constipation, diarrhea, nausea and vomiting.   Genitourinary:  Negative  for dysuria, frequency and urgency.   Musculoskeletal:  Negative for back pain, joint pain, myalgias and neck pain.   Skin:  Negative for rash.   Neurological:  Negative for dizziness, tingling, tremors, sensory change, speech change, focal weakness and headaches.   Psychiatric/Behavioral:  Negative for hallucinations and substance abuse.    All other systems reviewed and are negative.      Past Medical History   has a past medical history of Atrial fibrillation (HCC), Depression, and GERD (gastroesophageal reflux disease).    Surgical History   has no past surgical history on file.    Family History  No family history on file.    Social History   reports that he has never smoked. He does not have any smokeless tobacco history on file. He reports that he does not currently use alcohol.    Medications  Prior to Admission Medications   Prescriptions Last Dose Informant Patient Reported? Taking?   DULoxetine (CYMBALTA) 20 MG Cap DR Particles 7/15/2025  Yes No   Sig: Take 20 mg by mouth every day.   apixaban (ELIQUIS) 5mg Tab 7/15/2025  Yes Yes   Sig: Take 5 mg by mouth 2 times a day.   dilTIAZem CD (CARDIZEM CD) 240 MG CAPSULE SR 24 HR 7/15/2025  Yes No   Sig: Take 240 mg by mouth every day.   escitalopram (LEXAPRO) 10 MG Tab 7/15/2025  Yes No   Sig: Take 20 mg by mouth every day.   ezetimibe-simvastatin (VYTORIN) 10-40 MG per tablet 7/15/2025  Yes Yes   Sig: Take 1 Tablet by mouth every evening.   gabapentin (NEURONTIN) 300 MG Cap 7/15/2025  Yes No   Sig: Take 300 mg by mouth 3 times a day.   metoprolol SR (TOPROL XL) 25 MG TABLET SR 24 HR 7/15/2025  Yes Yes   Sig: Take 25 mg by mouth every day.   pantoprazole (PROTONIX) 40 MG Tablet Delayed Response 7/15/2025  Yes No   Sig: Take 40 mg by mouth every day.   potassium chloride (KLOR-CON) 20 MEQ Pack 7/15/2025  Yes No   Sig: Take 10 mEq by mouth every day.      Facility-Administered Medications: None       Allergies  Allergies[1]    Physical Exam  Temp:  [36 °C (96.8  °F)-36.3 °C (97.4 °F)] 36.2 °C (97.2 °F)  Pulse:  [] 59  Resp:  [15-48] 28  BP: ()/() 95/54  SpO2:  [89 %-98 %] 89 %    Physical Exam  Vitals reviewed.   Constitutional:       General: He is not in acute distress.  HENT:      Head: Normocephalic and atraumatic. No contusion.      Right Ear: External ear normal.      Left Ear: External ear normal.      Nose: Nose normal.      Mouth/Throat:      Pharynx: No oropharyngeal exudate.   Eyes:      General:         Right eye: No discharge.         Left eye: No discharge.      Pupils: Pupils are equal, round, and reactive to light.   Cardiovascular:      Rate and Rhythm: Bradycardia present. Rhythm irregular.      Heart sounds: No murmur heard.     No friction rub. No gallop.   Pulmonary:      Effort: Pulmonary effort is normal.      Breath sounds: No wheezing or rhonchi.   Abdominal:      General: Bowel sounds are normal. There is no distension.      Palpations: Abdomen is soft.      Tenderness: There is no abdominal tenderness. There is no rebound.   Musculoskeletal:         General: No swelling or tenderness. Normal range of motion.      Cervical back: No rigidity. No muscular tenderness.   Skin:     General: Skin is warm and dry.      Coloration: Skin is not jaundiced.   Neurological:      General: No focal deficit present.      Mental Status: He is alert and oriented to person, place, and time.      Cranial Nerves: No cranial nerve deficit.      Sensory: No sensory deficit.   Psychiatric:         Mood and Affect: Mood normal.         Fluids  Date 07/17/25 0700 - 07/18/25 0659   Shift 4969-8075 6410-5141 0820-6318 24 Hour Total   INTAKE   P.O. 180   180   Shift Total 180   180   OUTPUT   Urine 300   300   Shift Total 300   300   Weight (kg) 81.8 81.8 81.8 81.8       Laboratory  Recent Labs     07/17/25  0233   WBC 13.4*   RBC 4.85   HEMOGLOBIN 15.8   HEMATOCRIT 46.1   MCV 95.1   MCH 32.6   MCHC 34.3   RDW 47.8   PLATELETCT 283   MPV 9.7     Recent Labs      07/17/25  0500   SODIUM 136   POTASSIUM 3.4*   CHLORIDE 101   CO2 20   GLUCOSE 172*   BUN 11   CREATININE 0.82   CALCIUM 9.2                     Imaging  DX-OUTSIDE IMAGES-DX CHEST   Final Result          Assessment/Plan  Lactic acidosis  Assessment & Plan  Nausea medically stable.  I ordered repeat lactic acid level.    Neuropathy  Assessment & Plan  Cont home gabapentin.    Nausea & vomiting  Assessment & Plan  Continue symptomatic management  Significantly improved.    Severe hypertension  Assessment & Plan  /131 on arrival to our ICU  Asymptomatic  Continue outpatient metoprolol.  Holding Cardizem due to mild bradycardia  Continue as needed blood pressure medications.    Atrial fibrillation with rapid ventricular response (HCC)- (present on admission)  Assessment & Plan  Resume home cardizem and metoprolol.  Today I discontinued Cardizem as patient mildly bradycardic at time of evaluation.  Optimize electrolytes  Continue Eliquis  Continue to closely on telemetry.    Hypokalemia- (present on admission)  Assessment & Plan  Patient found to hypokalemia.  Ordered potassium replacement.  Plan is to recheck potassium level tomorrow.    GERD (gastroesophageal reflux disease)- (present on admission)  Assessment & Plan  Cont home PPI.    Reactive depression- (present on admission)  Assessment & Plan  Cont home lexapro and cymbalta.    High anion gap metabolic acidosis- (present on admission)  Assessment & Plan  Unclear cause - starvation ketosis vs euglycemic DKA - although patient is not diabetic and does not take SGLT2i   Patient did not require IV insulin infusion.  Now resolved.      Thank you for allow me to participate in patient care.  Hospitalist service will continue to follow this patient.    I reviewed and summarized patient hospitalization in this document.    I discussed plan of care with bedside RN in ICU.    I discussed plan of care with intensivist Dr. Tavares    [facility]  care             [1] No Known Allergies

## 2025-07-17 NOTE — PROGRESS NOTES
Time of Arrival: 0210  HR: 122  BP: 211/131  SpO2: 95  RR: 15  Temp: 96.8f  Weight: 81.8kg    Belongings at bedside:  Cell phone    4 Eyes Skin Assessment Completed by BUCK Gonzales and BUCK Espinal.    Skin assessment is primarily focused on high risk bony prominences. Pay special attention to skin beneath and around medical devices, high risk bony prominences, skin to skin areas and areas where the patient lacks sensation to feel pain and areas where the patient previously had breakdown.     Head (Occipital):  WDL   Ears (Under Medical Devices): WDL   Nose (Under Medical Devices): WDL   Mouth:  WDL   Neck: WDL   Breast/Chest:  Abrasion to right upper chest   Shoulder Blades:  WDL   Spine:   WDL   (R) Arm/Elbow/Hand:  scattered/healing abrasions, scratches and bruising/discoloration, skin tear to wrist under dressing in place on arrival.    (L) Arm/Elbow/Hand:  scattered/healing abrasions, scratches and bruising/discoloration   Abdomen: WDL   Pannus/Groin:  WDL   Sacrum/Coccyx:   Pink and Blanching   (R) Ischial Tuberosity (Sit Bones):  WDL   (L) Ischial Tuberosity (Sit Bones):  WDL   (R) Leg:   bruising and scattered scratches   (L) Leg:  Bruising and scattered scratches   (R) Heel:  Pink and Blanching   (R) Foot/Toe: Dry and flaky   (L) Heel: Pink and Blanching   (L) Foot/Toe:  Dry and flaky       DEVICES IN USE:   Respiratory Devices:  NA, patient on room air  Feeding Devices:  N/A   Lines & BP Monitoring Devices:  Peripheral IV, BP cuff, and Pulse ox    Orthopedic Devices:  N/A  Miscellaneous Devices:  N/A    PROTOCOL INTERVENTIONS:   ICU Low Airlo Bed:  Applied this assessment    WOUND PHOTOS:   yes    WOUND CONSULT:   Yes

## 2025-07-17 NOTE — PROGRESS NOTES
Critical Care Medicine   Progress Note    Date of service: 7/17/2025  Time: 0644    Mr. Costa is an 85-year-old male with a past medical history significant for CHF, atrial fibrillation on Eliquis, hypertension, prostate cancer, hyperlipidemia, and depression who presented to [place] emergency department on 7/16 with 2 days of persistent nausea and vomiting and was found to have severe hypertension, atrial fibrillation and RVR and labs consistent with possible euglycemic DKA versus starvation ketosis.  The patient received 2 L of IV fluids, 5 units of subcutaneous insulin and a dose of IV labetalol.  The patient was then transferred to our facility in the middle of the night and upon arrival to the ICU the patient was not in any distress.  The patient's vital signs were notable for atrial fibrillation and rates in the 120s with asymptomatic hypertension with a blood pressure in the 200s.  The patient did not require any insulin therapies overnight with a betahydroxy of 0.7 and was briefly on nicardipine.  He resumed all of his home medications and is currently hemodynamically stable on no drips and does not require the ICU any longer.    The case was discussed with the hospitalist team who will take over care at this time.  The critical care team will sign off at this point.    Thu Tavarse MD  Pulmonary and Critical Care Medicine

## 2025-07-17 NOTE — ASSESSMENT & PLAN NOTE
/131 on arrival to our ICU  Asymptomatic  PRN labetalol and cardene drip with goal SBP <180  Resume home metop and cardizem

## 2025-07-17 NOTE — CARE PLAN
The patient is Stable - Low risk of patient condition declining or worsening    Shift Goals  Clinical Goals: SBP <180  Patient Goals: Rest  Family Goals: Updates    Progress made toward(s) clinical / shift goals:      Problem: Safety  Goal: Will remain free from injury  Outcome: Progressing  Note: Call light in reach, bed in low locked position, bed alarm in place.       Patient is not progressing towards the following goals:

## 2025-07-17 NOTE — ASSESSMENT & PLAN NOTE
Previously taking diltiazem and metoprolol at home  Diltiazem discontinued due to bradycardia  - Increase metoprolol to 25 mg twice daily  - Continuous telemetry monitoring; monitoring for arrhythmia in the setting of A-fib with RVR  - Continue home Eliquis

## 2025-07-17 NOTE — ASSESSMENT & PLAN NOTE
Unclear cause - starvation ketosis vs euglycemic DKA - although patient is not diabetic and does not take SGLT2i   Will repeat labs, check BHB here before starting insulin infusion

## 2025-07-17 NOTE — CARE PLAN
The patient is Stable - Low risk of patient condition declining or worsening    Shift Goals  Clinical Goals: SBP <180  Patient Goals: Rest  Family Goals: Updates    Progress made toward(s) clinical / shift goals:    Problem: Knowledge Deficit - Standard  Goal: Patient and family/care givers will demonstrate understanding of plan of care, disease process/condition, diagnostic tests and medications  Outcome: Progressing     Problem: Safety  Goal: Will remain free from injury  Outcome: Progressing  Goal: Will remain free from falls  Outcome: Progressing     Problem: Skin Integrity  Goal: Risk for impaired skin integrity will decrease  Outcome: Progressing

## 2025-07-17 NOTE — PROGRESS NOTES
Pt admitted to T7, ambulated short distance from WC to bed w/ SBA. A/Ox4, [place], pleasant and conversant. Denies CP, SOB. Placed on telemetry. VSS.  Call light w/in reach and bed alarm in place.

## 2025-07-17 NOTE — ASSESSMENT & PLAN NOTE
/131 on arrival to our ICU  BP is 84- 113 overnight  - Continue home metoprolol  - Discontinue diltiazem due to bradycardia

## 2025-07-17 NOTE — WOUND TEAM
Wound team consulted for skin tear. Skin tear interventions ordered per RN wound protocol. Consult completed.

## 2025-07-17 NOTE — PROGRESS NOTES
Med rec partially complete per pt. Pt does not remember strengths of medications.   [facility] initially filled most prescriptions, but [facility] is closed for lunch.

## 2025-07-17 NOTE — CARE PLAN
The patient is Watcher - Medium risk of patient condition declining or worsening    Shift Goals  Clinical Goals: SBP <180, monitor labs  Patient Goals: rest  Family Goals: mina    Progress made toward(s) clinical / shift goals:        Problem: Knowledge Deficit - Standard  Goal: Patient and family/care givers will demonstrate understanding of plan of care, disease process/condition, diagnostic tests and medications  7/17/2025 0355 by Janet Ruano R.N.  Outcome: Progressing  7/17/2025 0355 by Janet Ruano R.N.  Outcome: Progressing     Problem: Safety  Goal: Will remain free from injury  7/17/2025 0355 by Janet Ruano R.N.  Outcome: Progressing  7/17/2025 0355 by Janet Ruano R.N.  Outcome: Progressing  Goal: Will remain free from falls  7/17/2025 0355 by ANDIE Garcia.N.  Outcome: Progressing  7/17/2025 0355 by MORTEZA GarciaN.  Outcome: Progressing     Problem: Skin Integrity  Goal: Risk for impaired skin integrity will decrease  7/17/2025 0355 by Janet Ruano R.N.  Outcome: Progressing  7/17/2025 0355 by Janet Ruano R.N.  Outcome: Progressing

## 2025-07-17 NOTE — PROGRESS NOTES
Waiting for faxed list of medications [phone] from [facility] in [place] , [place].

## 2025-07-17 NOTE — PROGRESS NOTES
[facility] DIRECT ADMIT ACCEPTANCE NOTE    Transferring facility: [place] [facility]  Transferring provider: Dr Mark Schwab  Chief complaint: 2 days nausea and vomiting   Pertinent history & patient course: 84 yo M w/ CHF, A fib, HTN, prostate cancer, and depression who presented to [place] ED with 2 days of persistent nausea and vomiting and was found to have severe range blood pressure, AF RVR and labs consistent with euglycemic DKA. He received IV fluids, SQ insulin, home BP meds and 1 dose of IV labetalol. VS afebrile, Hr 123, /133, RR 18, SPO2 98% on RA.   Pertinent imaging & lab results: /3.6/100/18.6/ ?BUN/0.72 with AGAP of 24 and glucose of 212.   Consultants called prior to transfer and pertinent input from consultants: None  Code Status: Unknown per transferring provider. Transferring provider to speak with patient and update us.   Reason for Transfer: Higher level of care   Further work up or recommendations requested prior to transfer: None    Patient accepted for transfer: Yes  Accepting [facility] Facility: [facility] to notify the Triage Coordinator in the RTOC via Voalte or Phone ext. [phone] when patient arrives to the unit. The Triage Coordinator will assign the admitting provider.    Consultants to be called upon arrival: None  Admission status: Inpatient.   Floor requested: ICU     The admitting provider is the point of contact for questions or concerns regarding the patient's care.   [Patient] : patient

## 2025-07-17 NOTE — ASSESSMENT & PLAN NOTE
Presenting symptom to OSH was nausea and vomiting x2 days  Unclear etiology   Will check lipase  Prn zofran

## 2025-07-17 NOTE — ASSESSMENT & PLAN NOTE
Unclear cause - starvation ketosis vs euglycemic DKA - although patient is not diabetic and does not take SGLT2i   Patient did not require IV insulin infusion.  Now resolved.

## 2025-07-17 NOTE — CONSULTS
Critical Care Consultation    Date of consult: 7/17/2025    Referring Physician  Jose Eastman M.D.    Reason for Consultation  Euglycemic DKA, AF RVR    History of Presenting Illness  84 yo M w/ CHF, A fib on eliquis, HTN, prostate cancer, DLD, and depression who presented to [place] ED with 2 days of persistent nausea and vomiting and was found to have severe range blood pressure, AF RVR and labs consistent with possible euglycemic DKA vs starvation ketosis . He received 2L IV fluids, 5U SQ insulin, and 1 dose of IV labetalol. VS at OSH afebrile, Hr 123, /133, RR 18, SPO2 98% on RA.     On arrival to our ICU Mr Costa was awake and alert and in no acute distress. He denies ongoing nausea and vomiting. His vital signs are notable for AF with rates in the 120s, asymptomatic HTN with /131 and normal oxygen saturations on room air. We will given labetalol and start nicardipine gtt. I reviewed patient's home medications with him - he is not on an SGLT2i and is not diabetic, so it is not clear to me why he would be in euglycemic DKA. We will manage his BP overnight and await repeat labs here before initiating DKA protocol.     Patient lives alone in [place], [place] and wishes to be full code. Patient denies illicit drug use or tobacco use. He does drink alcohol, but not every day and denies any history of w/d.     Code Status  Prior    Review of Systems  Review of Systems   Respiratory:  Negative for shortness of breath.    Cardiovascular:  Negative for chest pain.   Gastrointestinal:  Negative for abdominal pain, nausea and vomiting.       Past Medical History   has a past medical history of Atrial fibrillation (HCC), Depression, and GERD (gastroesophageal reflux disease).    Surgical History   has no past surgical history on file.    Family History  No family history on file.    Social History   reports that he has never smoked. He does not have any smokeless tobacco history on file. He reports  that he does not currently use alcohol.    Medications  Home Medications    **Home medications have not yet been reviewed for this encounter**       Current Medications[1]    Allergies  Allergies[2]    Vital Signs last 24 hours  BP: ()/()     Physical Exam  Physical Exam  Constitutional:       Appearance: Normal appearance.   Eyes:      Extraocular Movements: Extraocular movements intact.      Conjunctiva/sclera: Conjunctivae normal.   Cardiovascular:      Rate and Rhythm: Regular rhythm. Tachycardia present.   Pulmonary:      Effort: Pulmonary effort is normal.      Breath sounds: Normal breath sounds.   Abdominal:      Palpations: Abdomen is soft.      Tenderness: There is no abdominal tenderness.   Musculoskeletal:      Right lower leg: No edema.      Left lower leg: No edema.   Neurological:      General: No focal deficit present.      Mental Status: He is alert and oriented to person, place, and time.         Fluids  No intake or output data in the 24 hours ending 07/17/25 0204    Laboratory  No results found for this or any previous visit (from the past 48 hours).    Imaging  No orders to display       Assessment/Plan  Nausea & vomiting  Assessment & Plan  Presenting symptom to OSH was nausea and vomiting x2 days  Unclear etiology   Will check lipase  Prn zofran       Severe hypertension  Assessment & Plan  /131 on arrival to our ICU  Asymptomatic  PRN labetalol and cardene drip with goal SBP <180  Resume home metop and cardizem     Atrial fibrillation with rapid ventricular response (HCC)- (present on admission)  Assessment & Plan  Resume home cardizem and metoprolol     High anion gap metabolic acidosis- (present on admission)  Assessment & Plan  Unclear cause - starvation ketosis vs euglycemic DKA - although patient is not diabetic and does not take SGLT2i   Will repeat labs, check BHB here before starting insulin infusion    GERD (gastroesophageal reflux disease)- (present on admission)  Assessment &  Plan  Cont home PPI    Reactive depression- (present on admission)  Assessment & Plan  Cont home lexapro and cymbalta      Full Code  NPO for now  Eliquis     Discussed patient condition and risk of morbidity and/or mortality with RN and Patient.    The patient remains critically ill.  Critical care time = 40 minutes in directly providing and coordinating critical care and extensive data review.  No time overlap and excludes procedures.             [1]   No current facility-administered medications for this encounter.     Current Outpatient Medications   Medication Sig Dispense Refill    gabapentin (NEURONTIN) 300 MG Cap Take 300 mg by mouth every day.      pantoprazole (PROTONIX) 40 MG Tablet Delayed Response Take 40 mg by mouth every day.      escitalopram (LEXAPRO) 10 MG Tab Take 20 mg by mouth every day.      DULoxetine (CYMBALTA) 20 MG Cap DR Particles Take 20 mg by mouth every day.      apixaban (ELIQUIS) 5mg Tab Take 5 mg by mouth 2 times a day.      finasteride (PROSCAR) 5 MG Tab Take 5 mg by mouth every day.      dilTIAZem CD (CARDIZEM CD) 240 MG CAPSULE SR 24 HR Take 240 mg by mouth every day.      potassium chloride (KLOR-CON) 20 MEQ Pack Take 10 mEq by mouth every day.     [2] No Known Allergies

## 2025-07-18 LAB
ALBUMIN SERPL BCP-MCNC: 3.7 G/DL (ref 3.2–4.9)
ALBUMIN/GLOB SERPL: 1.4 G/DL
ALP SERPL-CCNC: 74 U/L (ref 30–99)
ALT SERPL-CCNC: 6 U/L (ref 2–50)
ANION GAP SERPL CALC-SCNC: 12 MMOL/L (ref 7–16)
AST SERPL-CCNC: 20 U/L (ref 12–45)
BILIRUB SERPL-MCNC: 0.8 MG/DL (ref 0.1–1.5)
BUN SERPL-MCNC: 13 MG/DL (ref 8–22)
CALCIUM ALBUM COR SERPL-MCNC: 9.4 MG/DL (ref 8.5–10.5)
CALCIUM SERPL-MCNC: 9.2 MG/DL (ref 8.5–10.5)
CHLORIDE SERPL-SCNC: 106 MMOL/L (ref 96–112)
CO2 SERPL-SCNC: 22 MMOL/L (ref 20–33)
CREAT SERPL-MCNC: 0.8 MG/DL (ref 0.5–1.4)
ERYTHROCYTE [DISTWIDTH] IN BLOOD BY AUTOMATED COUNT: 49.9 FL (ref 35.9–50)
GFR SERPLBLD CREATININE-BSD FMLA CKD-EPI: 88 ML/MIN/1.73 M 2
GLOBULIN SER CALC-MCNC: 2.7 G/DL (ref 1.9–3.5)
GLUCOSE SERPL-MCNC: 97 MG/DL (ref 65–99)
HCT VFR BLD AUTO: 44.1 % (ref 42–52)
HGB BLD-MCNC: 14.6 G/DL (ref 14–18)
MAGNESIUM SERPL-MCNC: 1.8 MG/DL (ref 1.5–2.5)
MCH RBC QN AUTO: 32.3 PG (ref 27–33)
MCHC RBC AUTO-ENTMCNC: 33.1 G/DL (ref 32.3–36.5)
MCV RBC AUTO: 97.6 FL (ref 81.4–97.8)
PHOSPHATE SERPL-MCNC: 2.3 MG/DL (ref 2.5–4.5)
PLATELET # BLD AUTO: 250 K/UL (ref 164–446)
PMV BLD AUTO: 9.5 FL (ref 9–12.9)
POTASSIUM SERPL-SCNC: 3.9 MMOL/L (ref 3.6–5.5)
PROT SERPL-MCNC: 6.4 G/DL (ref 6–8.2)
RBC # BLD AUTO: 4.52 M/UL (ref 4.7–6.1)
SODIUM SERPL-SCNC: 140 MMOL/L (ref 135–145)
WBC # BLD AUTO: 9.5 K/UL (ref 4.8–10.8)

## 2025-07-18 PROCEDURE — 80053 COMPREHEN METABOLIC PANEL: CPT

## 2025-07-18 PROCEDURE — 99233 SBSQ HOSP IP/OBS HIGH 50: CPT | Performed by: STUDENT IN AN ORGANIZED HEALTH CARE EDUCATION/TRAINING PROGRAM

## 2025-07-18 PROCEDURE — 36415 COLL VENOUS BLD VENIPUNCTURE: CPT

## 2025-07-18 PROCEDURE — 97535 SELF CARE MNGMENT TRAINING: CPT

## 2025-07-18 PROCEDURE — 700102 HCHG RX REV CODE 250 W/ 637 OVERRIDE(OP): Performed by: STUDENT IN AN ORGANIZED HEALTH CARE EDUCATION/TRAINING PROGRAM

## 2025-07-18 PROCEDURE — 700111 HCHG RX REV CODE 636 W/ 250 OVERRIDE (IP): Performed by: STUDENT IN AN ORGANIZED HEALTH CARE EDUCATION/TRAINING PROGRAM

## 2025-07-18 PROCEDURE — 97162 PT EVAL MOD COMPLEX 30 MIN: CPT

## 2025-07-18 PROCEDURE — 83735 ASSAY OF MAGNESIUM: CPT

## 2025-07-18 PROCEDURE — A9270 NON-COVERED ITEM OR SERVICE: HCPCS | Performed by: STUDENT IN AN ORGANIZED HEALTH CARE EDUCATION/TRAINING PROGRAM

## 2025-07-18 PROCEDURE — 84100 ASSAY OF PHOSPHORUS: CPT

## 2025-07-18 PROCEDURE — 97166 OT EVAL MOD COMPLEX 45 MIN: CPT

## 2025-07-18 PROCEDURE — 85027 COMPLETE CBC AUTOMATED: CPT

## 2025-07-18 PROCEDURE — 770020 HCHG ROOM/CARE - TELE (206)

## 2025-07-18 RX ORDER — METOPROLOL TARTRATE 25 MG/1
25 TABLET, FILM COATED ORAL 2 TIMES DAILY
Status: DISCONTINUED | OUTPATIENT
Start: 2025-07-18 | End: 2025-07-19 | Stop reason: HOSPADM

## 2025-07-18 RX ORDER — MAGNESIUM SULFATE HEPTAHYDRATE 40 MG/ML
2 INJECTION, SOLUTION INTRAVENOUS ONCE
Status: COMPLETED | OUTPATIENT
Start: 2025-07-18 | End: 2025-07-18

## 2025-07-18 RX ADMIN — GABAPENTIN 300 MG: 300 CAPSULE ORAL at 16:58

## 2025-07-18 RX ADMIN — MAGNESIUM SULFATE HEPTAHYDRATE 2 G: 2 INJECTION, SOLUTION INTRAVENOUS at 13:01

## 2025-07-18 RX ADMIN — METOPROLOL SUCCINATE 25 MG: 50 TABLET, EXTENDED RELEASE ORAL at 05:54

## 2025-07-18 RX ADMIN — ESCITALOPRAM OXALATE 20 MG: 10 TABLET ORAL at 05:55

## 2025-07-18 RX ADMIN — APIXABAN 5 MG: 5 TABLET, FILM COATED ORAL at 05:54

## 2025-07-18 RX ADMIN — SIMVASTATIN 40 MG: 40 TABLET, FILM COATED ORAL at 21:31

## 2025-07-18 RX ADMIN — METOPROLOL TARTRATE 25 MG: 25 TABLET, FILM COATED ORAL at 16:58

## 2025-07-18 RX ADMIN — GABAPENTIN 300 MG: 300 CAPSULE ORAL at 12:55

## 2025-07-18 RX ADMIN — APIXABAN 5 MG: 5 TABLET, FILM COATED ORAL at 16:58

## 2025-07-18 RX ADMIN — GABAPENTIN 300 MG: 300 CAPSULE ORAL at 05:54

## 2025-07-18 RX ADMIN — OMEPRAZOLE 20 MG: 20 CAPSULE, DELAYED RELEASE ORAL at 05:53

## 2025-07-18 RX ADMIN — DULOXETINE 20 MG: 20 CAPSULE, DELAYED RELEASE ORAL at 05:53

## 2025-07-18 RX ADMIN — EZETIMIBE 10 MG: 10 TABLET ORAL at 21:31

## 2025-07-18 ASSESSMENT — ENCOUNTER SYMPTOMS
SHORTNESS OF BREATH: 0
ABDOMINAL PAIN: 0
NAUSEA: 0
FEVER: 0
VOMITING: 0

## 2025-07-18 ASSESSMENT — COGNITIVE AND FUNCTIONAL STATUS - GENERAL
CLIMB 3 TO 5 STEPS WITH RAILING: A LITTLE
SUGGESTED CMS G CODE MODIFIER DAILY ACTIVITY: CJ
MOVING TO AND FROM BED TO CHAIR: A LITTLE
WALKING IN HOSPITAL ROOM: A LITTLE
WALKING IN HOSPITAL ROOM: A LITTLE
SUGGESTED CMS G CODE MODIFIER MOBILITY: CJ
DRESSING REGULAR LOWER BODY CLOTHING: A LITTLE
CLIMB 3 TO 5 STEPS WITH RAILING: A LITTLE
DAILY ACTIVITIY SCORE: 22
MOBILITY SCORE: 20
MOBILITY SCORE: 22
DAILY ACTIVITIY SCORE: 24
SUGGESTED CMS G CODE MODIFIER MOBILITY: CJ
HELP NEEDED FOR BATHING: A LITTLE
SUGGESTED CMS G CODE MODIFIER DAILY ACTIVITY: CH
STANDING UP FROM CHAIR USING ARMS: A LITTLE

## 2025-07-18 ASSESSMENT — GAIT ASSESSMENTS
ASSISTIVE DEVICE: FRONT WHEEL WALKER
DEVIATION: INCREASED BASE OF SUPPORT
GAIT LEVEL OF ASSIST: SUPERVISED
DISTANCE (FEET): 500

## 2025-07-18 ASSESSMENT — FIBROSIS 4 INDEX: FIB4 SCORE: 2.15

## 2025-07-18 ASSESSMENT — ACTIVITIES OF DAILY LIVING (ADL): TOILETING: INDEPENDENT

## 2025-07-18 NOTE — PROGRESS NOTES
Monitor summary:        Rhythm: a flutter  Rate: 61-81  Ectopy: (o)PVC  Measurements: /.09/        12hr chart check

## 2025-07-18 NOTE — PROGRESS NOTES
Telemetry Shift Summary    Rhythm a flutter  HR Range 60  Ectopy rare pvcs  Measurements -/0.07/-        Normal Values  Rhythm SR  HR Range    Measurements 0.12-0.20 / 0.06-0.10  / 0.30-0.52

## 2025-07-18 NOTE — HOSPITAL COURSE
Dany Costa is an 85-year-old male with PMHx HTN, atrial fibrillation on Eliquis, HLD, prostate cancer.  Admitted 7/17 for hypertensive urgency and A-fib with RVR.    Prior history: Patient initially presented to OSH for nausea and vomiting.  He was found to have hypertensive emergency with atrial fibrillation and RVR.  Labs were concerning for euglycemic DKA versus starvation ketosis.  He received 2 L IV fluid bolus, subcutaneous insulin and IV labetalol.  He was transferred to [facility] for higher level of care.    In the IMCU-patient did not require insulin infusion therapy.  Beta hydroxybutyrate of 0.7.  He briefly required nicardipine infusion for hypertensive urgency.  Lab work and blood pressures rapidly improved.    Patient tolerating metoprolol alone for rate control.  Increased home dosing to 50 mg extended release daily.  Advised patient to discontinue diltiazem due to hypotension and mild bradycardia with both metoprolol and diltiazem on board.  He will need to follow with his primary care physician for ongoing medication adjustments.

## 2025-07-18 NOTE — PROGRESS NOTES
Hospital Medicine Daily Progress Note    Date of Service  7/18/2025    Chief Complaint  Pamela Costa is a 85 y.o. male admitted 7/17/2025 with nausea vomiting    Hospital Course  Dany Costa is an 85-year-old male with PMHx HTN, atrial fibrillation on Eliquis, HLD, prostate cancer.  Admitted 7/17 for hypertensive urgency and A-fib with RVR.    Prior history: Patient initially presented to OSH for nausea and vomiting.  He was found to have hypertensive emergency with atrial fibrillation and RVR.  Labs were concerning for euglycemic DKA versus starvation ketosis.  He received 2 L IV fluid bolus, subcutaneous insulin and IV labetalol.  He was transferred to [facility] for higher level of care.    In the IMCU-patient did not require insulin infusion therapy.  Beta hydroxybutyrate of 0.7.  He briefly required nicardipine infusion for hypertensive urgency.  Lab work and blood pressures rapidly improved.    Interval Problem Update  7/18: Vitals notable for SBP ranging  over the last 24 hours.  Patient is on room air.  WBC 9 from 13.  Hb stable at 14.  Creatinine stable at 0.81.  Magnesium 1.8; replaced.  Changed to metoprolol 25 mg twice daily.    I have discussed this patient's plan of care and discharge plan at IDT rounds today with Case Management, Nursing, Nursing leadership, and other members of the IDT team.    Consultants/Specialty  critical care    Code Status  Full Code    Disposition  The patient is not medically cleared for discharge to home or a post-acute facility.  Anticipate discharge to: home with close outpatient follow-up    I have placed the appropriate orders for post-discharge needs.    Review of Systems  Review of Systems   Constitutional:  Negative for fever and malaise/fatigue.   Respiratory:  Negative for shortness of breath.    Cardiovascular:  Negative for chest pain and leg swelling.   Gastrointestinal:  Negative for abdominal pain, nausea and vomiting.        Physical Exam  Temp:  [36.4  °C (97.5 °F)-36.7 °C (98.1 °F)] 36.4 °C (97.5 °F)  Pulse:  [] 124  Resp:  [14-19] 19  BP: ()/(40-89) 136/85  SpO2:  [90 %-95 %] 95 %    Physical Exam  Vitals and nursing note reviewed.   Constitutional:       General: He is not in acute distress.     Appearance: Normal appearance. He is not ill-appearing.   Cardiovascular:      Rate and Rhythm: Normal rate and regular rhythm.      Heart sounds: Murmur heard.   Pulmonary:      Effort: Pulmonary effort is normal. No respiratory distress.      Breath sounds: Normal breath sounds. No wheezing.   Skin:     General: Skin is warm and dry.   Neurological:      Mental Status: He is alert and oriented to person, place, and time. Mental status is at baseline.   Psychiatric:         Mood and Affect: Mood normal.         Behavior: Behavior normal.         Fluids  No intake or output data in the 24 hours ending 07/18/25 1213       Laboratory  Recent Labs     07/17/25  0233 07/18/25  0630   WBC 13.4* 9.5   RBC 4.85 4.52*   HEMOGLOBIN 15.8 14.6   HEMATOCRIT 46.1 44.1   MCV 95.1 97.6   MCH 32.6 32.3   MCHC 34.3 33.1   RDW 47.8 49.9   PLATELETCT 283 250   MPV 9.7 9.5     Recent Labs     07/17/25  0500 07/18/25  0630   SODIUM 136 140   POTASSIUM 3.4* 3.9   CHLORIDE 101 106   CO2 20 22   GLUCOSE 172* 97   BUN 11 13   CREATININE 0.82 0.80   CALCIUM 9.2 9.2                   Imaging  DX-OUTSIDE IMAGES-DX CHEST   Final Result           Assessment/Plan  * Atrial fibrillation with rapid ventricular response (HCC)- (present on admission)  Assessment & Plan  Previously taking diltiazem and metoprolol at home  Diltiazem discontinued due to bradycardia  - Increase metoprolol to 25 mg twice daily  - Continuous telemetry monitoring; monitoring for arrhythmia in the setting of A-fib with RVR  - Continue home Eliquis    Lactic acidosis  Assessment & Plan  Resolved    Neuropathy  Assessment & Plan  Cont home gabapentin.    Nausea & vomiting  Assessment & Plan  Continue symptomatic  management  Significantly improved.    Severe hypertension  Assessment & Plan  /131 on arrival to our ICU  BP is 84- 113 overnight  - Continue home metoprolol  - Discontinue diltiazem due to bradycardia    Hypokalemia- (present on admission)  Assessment & Plan  Potassium improved to 3.9 today  - Repeat BMP in a.m.    GERD (gastroesophageal reflux disease)- (present on admission)  Assessment & Plan  Continue home omeprazole    Reactive depression- (present on admission)  Assessment & Plan  Cont home lexapro and cymbalta.    High anion gap metabolic acidosis- (present on admission)  Assessment & Plan  Unclear cause - starvation ketosis vs euglycemic DKA - although patient is not diabetic and does not take SGLT2i   Patient did not require IV insulin infusion.  Now resolved.         VTE prophylaxis:   SCDs/TEDs   therapeutic anticoagulation with eliquis 5 mg BID      I have performed a physical exam and reviewed and updated ROS and Plan today (7/18/2025). In review of yesterday's note (7/17/2025), there are no changes except as documented above.

## 2025-07-18 NOTE — PROGRESS NOTES
Bedside report received, pt care assumed, tele box on.  Pt denies any additional needs at this time. Bed in lowest position, bed alarm on pt educated on fall risk and verbalized understanding, call light within reach.

## 2025-07-18 NOTE — CARE PLAN
The patient is Stable - Low risk of patient condition declining or worsening    Shift Goals  Clinical Goals: monitor hemodynamics  Patient Goals: comfort, rest  Family Goals: Updates    Progress made toward(s) clinical / shift goals:      Problem: Knowledge Deficit - Standard  Goal: Patient and family/care givers will demonstrate understanding of plan of care, disease process/condition, diagnostic tests and medications  Outcome: Progressing  Note: Plan of care d/w patient.       Patient is not progressing towards the following goals:

## 2025-07-18 NOTE — THERAPY
Speech Language Therapy Contact Note    Patient Name: Pamela Costa  Age:  85 y.o., Sex:  male  Medical Record #: [id number]  Today's Date: 7/18/2025 07/18/25 0920   Initial Contact Note    Initial Contact Note  Order Received and Verified. Speech Therapy Evaluation NOT Completed Because Patient Does Not Require Acute Speech Therapy at this Time.   Interdisciplinary Plan of Care Collaboration   IDT Collaboration with  Physician   Collaboration Comments Order received for cognitive evaluation. Chart reviewed: no head imaging, no notes on acute cognition changes. MD cleared to cancel orders.

## 2025-07-18 NOTE — PROGRESS NOTES
4 Eyes Skin Assessment Completed by Mckinley DIAZ RN and Franko OTERO RN.    Skin assessment is primarily focused on high risk bony prominences. Pay special attention to skin beneath and around medical devices, high risk bony prominences, skin to skin areas and areas where the patient lacks sensation to feel pain and areas where the patient previously had breakdown.     Head (Occipital):  WDL   Ears (Under Medical Devices): WDL   Nose (Under Medical Devices): WDL   Mouth:  WDL   Neck: WDL   Breast/Chest:  Red and Light Purple   Shoulder Blades:  WDL   Spine:   WDL   (R) Arm/Elbow/Hand: Abrasion and Skin Tear   (L) Arm/Elbow/Hand: Abrasion, Skin Tear, and Purple/maroon   Abdomen: WDL   Pannus/Groin:  WDL   Sacrum/Coccyx:   WDL   (R) Ischial Tuberosity (Sit Bones):  WDL   (L) Ischial Tuberosity (Sit Bones):  Abrasion, Purple/maroon, and Light Purple   (R) Leg:  Abrasion, Purple/maroon, and Light Purple   (L) Leg:  WDL   (R) Heel:  WDL   (R) Foot/Toe: WDL   (L) Heel: WDL   (L) Foot/Toe:  WDL       DEVICES IN USE:   Respiratory Devices:  NA, patient on room air  Feeding Devices:  N/A   Lines & BP Monitoring Devices:  Peripheral IV    Orthopedic Devices:  N/A  Miscellaneous Devices:  Telemetry monitor    PROTOCOL INTERVENTIONS:   Standard/Trauma Bed:  Already in place    WOUND PHOTOS:   Completed and in EPIC     WOUND CONSULT:   N/A, no advanced wound care needs identified

## 2025-07-18 NOTE — DISCHARGE PLANNING
Met with pt at bedside to complete assessment.   Verified information listed on facesheet. Pt confirms living at [address] [place], [place] but states he will be discharging to his second home- [address] [place] [address]. Pt states he lives there alone in a single story home with 3 steps to enter. Pt reports being independent with self care. He doesn't use any DME at baseline but does own a walker and a cane. Pt reports having good support from his neighbors, Mary and Amador. He states they're able to assist him if needed. Pt reports having a PCP but can't recall PCP's name. He states office is in [place], [place]. Pt [facility] [place]. Pt states his son picks them up and ships them to him. Pt's son lives at his home in [place]. He states son is supportive and they speak daily.   Anticipate home with no needs. Pt states Mary and Amador have offered to pick him up at [place]. If they're unable to pt states he can arrange his own Uber.     Care Transition Team Assessment    Information Source  Orientation Level: Oriented X4  Information Given By: Patient  Who is responsible for making decisions for patient? : Patient    Readmission Evaluation  Is this a readmission?: No    Elopement Risk  Legal Hold: No  Ambulatory or Self Mobile in Wheelchair: Yes  Disoriented: No  Psychiatric Symptoms: None  History of Wandering: No  Elopement this Admit: No  Vocalizing Wanting to Leave: No  Displays Behaviors, Body Language Wanting to Leave: No-Not at Risk for Elopement  Elopement Risk: Not at Risk for Elopement         Discharge Preparedness  What is your plan after discharge?: Home with help  What are your discharge supports?: Child  Prior Functional Level: Ambulatory, Independent with Activities of Daily Living, Independent with Medication Management  Difficulity with ADLs: None  Difficulity with IADLs: None    Functional Assesment  Prior Functional Level: Ambulatory, Independent with Activities of Daily Living,  Independent with Medication Management    Finances  Financial Barriers to Discharge: No  Prescription Coverage: Yes    Vision / Hearing Impairment  Hearing Impairment: Both Ears, Hearing Device Not Available         Advance Directive  Advance Directive?: None         Psychological Assessment  History of Substance Abuse: None  History of Psychiatric Problems: No  Non-compliant with Treatment: No  Newly Diagnosed Illness: Yes    Discharge Risks or Barriers  Discharge risks or barriers?: No    Anticipated Discharge Information  Discharge Disposition: Discharged to home/self care (01)  Discharge Address: [address] [place] [id number]  Discharge Contact Phone Number: [phone]

## 2025-07-18 NOTE — CARE PLAN
The patient is Stable - Low risk of patient condition declining or worsening    Shift Goals  Clinical Goals: vss, monitor heart rate/rhythm  Patient Goals: comfort, rest  Family Goals: Updates    Progress made toward(s) clinical / shift goals:    Problem: Knowledge Deficit - Standard  Goal: Patient and family/care givers will demonstrate understanding of plan of care, disease process/condition, diagnostic tests and medications  Outcome: Progressing     Problem: Safety  Goal: Will remain free from injury  Outcome: Progressing  Goal: Will remain free from falls  Outcome: Progressing     Problem: Skin Integrity  Goal: Risk for impaired skin integrity will decrease  Outcome: Progressing     Problem: Hemodynamics  Goal: Patient's hemodynamics, fluid balance and neurologic status will be stable or improve  Outcome: Progressing       Patient is not progressing towards the following goals:

## 2025-07-18 NOTE — PROGRESS NOTES
[facility] rec [facility] and [facility].      Allergies reviewed.    Has patient had any outside antibiotics in the last 30 days? N    Antibiotics: nitrofurantoin, doxycycline, sulfamethoxazole-trimethoprim?N    Antiparasitics include: albendazole, ivermectin, pyrantel pamoate (OTC), mebendazole and metronidazole?N  N  Antivirals: acyclovir, valacyclovir?N    Antifungals: voriconazole, posaconazole, and isavuconazonium (Cresemba®)?N       Any Anticoagulants (rivaroxaban, apixaban, edoxaban, dabigatran, warfarin, enoxaparin) taken in the last 14 days? Y  Anticoagulant name: Eliquis, Last dose: unknown.

## 2025-07-18 NOTE — THERAPY
Occupational Therapy   Initial Evaluation     Patient Name:  Pamela Costa  Age:  85 y.o., Sex:  male  Medical Record #:  [id number]  Today's Date:  7/18/2025          Assessment    Patient is 85 y.o. male admitted for nausea, vomiting, HTN urgency, afib with RVR, possible euglycemic DKA versus starvation ketosis. Pt normally independent with functional mobility and ADLs living in a [facility] alone. Pt able to complete all functional mobility and ADLs with supervision and use of FWW, including seated shower. Anticipate no further OT needs, will complete order at this time.      Plan    DC Equipment Recommendations: (P) None  Discharge Recommendations: (P) Anticipate that the patient will have no further occupational therapy needs after discharge from the hospital     Objective       07/18/25 1448   Prior Living Situation   Prior Services None   Housing / Facility 1 Story House   Steps Into Home 3   Rail Both Rail (Steps into Home)   Bathroom Set up Walk In Shower;Grab Bars;Shower Chair   Equipment Owned 4-Wheel Walker;Single Point Cane;Tub / Shower Seat;Grab Bar(s) In Tub / Shower;Grab Bar(s) By Toilet   Lives with - Patient's Self Care Capacity Alone and Able to Care For Self   Prior Level of ADL Function   Self Feeding Independent   Grooming / Hygiene Independent   Bathing Independent   Dressing Independent   Toileting Independent   Prior Level of IADL Function   Medication Management Independent   Laundry Independent   Kitchen Mobility Independent   Finances Independent   Home Management Independent   Shopping Independent   Prior Level Of Mobility Independent Without Device in Home;Independent With Device in Community   Driving / Transportation Driving Independent   Occupation (Pre-Hospital Vocational) Retired Due To Age   History of Falls   History of Falls No   Vitals   O2 Delivery Device None - Room Air   Pain 0 - 10 Group   Therapist Pain Assessment Post Activity Pain Same as Prior to Activity;Nurse Notified    Cognition    Cognition / Consciousness WDL   Level of Consciousness Alert   Comments Very pleasant, cooperative, receptive   Active ROM Upper Body   Active ROM Upper Body  WDL   Dominant Hand Right   Strength Upper Body   Upper Body Strength  WDL   Sensation Upper Body   Upper Extremity Sensation  WDL   Upper Body Muscle Tone   Upper Body Muscle Tone  WDL   Neurological Concerns   Neurological Concerns No   Coordination Upper Body   Coordination WDL   Balance Assessment   Sitting Balance (Static) Good   Sitting Balance (Dynamic) Good   Standing Balance (Static) Good   Standing Balance (Dynamic) Good   Weight Shift Sitting Good   Weight Shift Standing Good   Comments w/ FWW   Bed Mobility    Supine to Sit Supervised   Scooting Supervised   Rolling Supervised   ADL Assessment   Grooming Supervision;Seated   Bathing Supervision   Upper Body Dressing Supervision   Lower Body Dressing Supervision   Toileting Supervision   How much help from another person does the patient currently need...   Putting on and taking off regular lower body clothing? 4   Bathing (including washing, rinsing, and drying)? 4   Toileting, which includes using a toilet, bedpan, or urinal? 4   Putting on and taking off regular upper body clothing? 4   Taking care of personal grooming such as brushing teeth? 4   Eating meals? 4   6 Clicks Daily Activity Score 24   Functional Mobility   Sit to Stand Supervised   Bed, Chair, Wheelchair Transfer Supervised   Tub / Shower Transfers Supervised   Transfer Method Stand Step   Mobility bed mobility, ambulate to bathroom, seated shower, left seated EOB for PT   Comments w/ FWW   Activity Tolerance   Sitting in Chair 20 min  (shower chair)   Standing 5 min   Education Group   Education Provided Role of Occupational Therapist;Adaptive Equipment   Role of Occupational Therapist Patient Response Patient;Acceptance;Explanation   Adaptive Equipment Patient Response Patient;Acceptance;Explanation   Problem List    Problem List None   Anticipated Discharge Equipment and Recommendations   DC Equipment Recommendations None   Discharge Recommendations Anticipate that the patient will have no further occupational therapy needs after discharge from the hospital   Interdisciplinary Plan of Care Collaboration   IDT Collaboration with  Nursing;Physical Therapist   Patient Position at End of Therapy In Bed;Bed Alarm On;Call Light within Reach;Tray Table within Reach;Phone within Reach   Collaboration Comments RN updated

## 2025-07-19 VITALS
SYSTOLIC BLOOD PRESSURE: 126 MMHG | OXYGEN SATURATION: 94 % | BODY MASS INDEX: 26.95 KG/M2 | HEART RATE: 129 BPM | RESPIRATION RATE: 17 BRPM | TEMPERATURE: 97.2 F | HEIGHT: 70 IN | WEIGHT: 188.27 LBS | DIASTOLIC BLOOD PRESSURE: 89 MMHG

## 2025-07-19 LAB
ANION GAP SERPL CALC-SCNC: 10 MMOL/L (ref 7–16)
BUN SERPL-MCNC: 10 MG/DL (ref 8–22)
CALCIUM SERPL-MCNC: 9.2 MG/DL (ref 8.5–10.5)
CHLORIDE SERPL-SCNC: 102 MMOL/L (ref 96–112)
CO2 SERPL-SCNC: 25 MMOL/L (ref 20–33)
CREAT SERPL-MCNC: 0.85 MG/DL (ref 0.5–1.4)
GFR SERPLBLD CREATININE-BSD FMLA CKD-EPI: 86 ML/MIN/1.73 M 2
GLUCOSE SERPL-MCNC: 100 MG/DL (ref 65–99)
MAGNESIUM SERPL-MCNC: 2.1 MG/DL (ref 1.5–2.5)
POTASSIUM SERPL-SCNC: 4.4 MMOL/L (ref 3.6–5.5)
SODIUM SERPL-SCNC: 137 MMOL/L (ref 135–145)

## 2025-07-19 PROCEDURE — 700102 HCHG RX REV CODE 250 W/ 637 OVERRIDE(OP): Performed by: STUDENT IN AN ORGANIZED HEALTH CARE EDUCATION/TRAINING PROGRAM

## 2025-07-19 PROCEDURE — A9270 NON-COVERED ITEM OR SERVICE: HCPCS | Performed by: STUDENT IN AN ORGANIZED HEALTH CARE EDUCATION/TRAINING PROGRAM

## 2025-07-19 PROCEDURE — 83735 ASSAY OF MAGNESIUM: CPT

## 2025-07-19 PROCEDURE — 99239 HOSP IP/OBS DSCHRG MGMT >30: CPT | Performed by: STUDENT IN AN ORGANIZED HEALTH CARE EDUCATION/TRAINING PROGRAM

## 2025-07-19 PROCEDURE — 80048 BASIC METABOLIC PNL TOTAL CA: CPT

## 2025-07-19 PROCEDURE — 36415 COLL VENOUS BLD VENIPUNCTURE: CPT

## 2025-07-19 RX ORDER — METOPROLOL SUCCINATE 25 MG/1
50 TABLET, EXTENDED RELEASE ORAL DAILY
Qty: 30 TABLET | Refills: 3 | Status: SHIPPED | OUTPATIENT
Start: 2025-07-19

## 2025-07-19 RX ADMIN — OMEPRAZOLE 20 MG: 20 CAPSULE, DELAYED RELEASE ORAL at 05:48

## 2025-07-19 RX ADMIN — ESCITALOPRAM OXALATE 20 MG: 10 TABLET ORAL at 05:48

## 2025-07-19 RX ADMIN — APIXABAN 5 MG: 5 TABLET, FILM COATED ORAL at 05:48

## 2025-07-19 RX ADMIN — GABAPENTIN 300 MG: 300 CAPSULE ORAL at 05:48

## 2025-07-19 RX ADMIN — DULOXETINE 20 MG: 20 CAPSULE, DELAYED RELEASE ORAL at 05:48

## 2025-07-19 RX ADMIN — METOPROLOL TARTRATE 25 MG: 25 TABLET, FILM COATED ORAL at 05:48

## 2025-07-19 ASSESSMENT — FIBROSIS 4 INDEX: FIB4 SCORE: 2.71

## 2025-07-19 ASSESSMENT — PAIN DESCRIPTION - PAIN TYPE: TYPE: ACUTE PAIN

## 2025-07-19 NOTE — CARE PLAN
The patient is Stable - Low risk of patient condition declining or worsening    Shift Goals  Clinical Goals: vss  Patient Goals: comfort, rest  Family Goals: Updates    Progress made toward(s) clinical / shift goals:    Problem: Knowledge Deficit - Standard  Goal: Patient and family/care givers will demonstrate understanding of plan of care, disease process/condition, diagnostic tests and medications  Outcome: Progressing     Problem: Safety  Goal: Will remain free from injury  Outcome: Progressing  Goal: Will remain free from falls  Outcome: Progressing     Problem: Skin Integrity  Goal: Risk for impaired skin integrity will decrease  Outcome: Progressing     Problem: Hemodynamics  Goal: Patient's hemodynamics, fluid balance and neurologic status will be stable or improve  Outcome: Progressing       Patient is not progressing towards the following goals:

## 2025-07-19 NOTE — PROGRESS NOTES
Telemetry Shift Summary    Rhythm a fib  HR Range   Ectopy rare pvcs  Measurements --/0.07/0.32        Normal Values  Rhythm SR  HR Range    Measurements 0.12-0.20 / 0.06-0.10  / 0.30-0.52

## 2025-07-19 NOTE — DISCHARGE SUMMARY
Discharge Summary    CHIEF COMPLAINT ON ADMISSION  No chief complaint on file.      Reason for Admission  DKA     Admission Date  7/17/2025    CODE STATUS  Full Code    HPI & HOSPITAL COURSE    Dany Costa is an 85-year-old male with PMHx HTN, atrial fibrillation on Eliquis, HLD, prostate cancer.  Admitted 7/17 for hypertensive urgency and A-fib with RVR.    Prior history: Patient initially presented to OSH for nausea and vomiting.  He was found to have hypertensive emergency with atrial fibrillation and RVR.  Labs were concerning for euglycemic DKA versus starvation ketosis.  He received 2 L IV fluid bolus, subcutaneous insulin and IV labetalol.  He was [facility] to [facility] for higher level of care.    In the IMCU-patient did not require insulin infusion therapy.  Beta hydroxybutyrate of 0.7.  He briefly required nicardipine infusion for hypertensive urgency.  Lab work and blood pressures rapidly improved.    Patient tolerating metoprolol alone for rate control.  Increased home dosing to 50 mg extended release daily.  Advised patient to discontinue diltiazem due to hypotension and mild bradycardia with both metoprolol and diltiazem on board.  He will need to follow with his primary care physician for ongoing medication adjustments.    Therefore, he is discharged in good and stable condition to home with close outpatient follow-up.    The patient met 2-midnight criteria for an inpatient stay at the time of discharge.    Discharge Date  7/19/2025    FOLLOW UP ITEMS POST DISCHARGE  Follow-up with primary care physician-1 week    DISCHARGE DIAGNOSES  Principal Problem:    Atrial fibrillation with rapid ventricular response (HCC) (POA: Yes)  Active Problems:    High anion gap metabolic acidosis (POA: Yes)    Reactive depression (POA: Yes)    GERD (gastroesophageal reflux disease) (POA: Yes)    Hypokalemia (POA: Yes)    Severe hypertension (POA: Unknown)    Nausea & vomiting (POA: Unknown)    Neuropathy (POA:  Unknown)    Lactic acidosis (POA: Unknown)  Resolved Problems:    Secondary DM with DKA (HCC) (POA: Yes)      FOLLOW UP  No future appointments.  No follow-up provider specified.    MEDICATIONS ON DISCHARGE     Medication List        CHANGE how you take these medications        Instructions   metoprolol SR 25 MG Tb24  What changed: how much to take  Commonly known as: Toprol XL   Take 2 Tablets by mouth every day.  Dose: 50 mg            CONTINUE taking these medications        Instructions   apixaban 5 MG Tabs  Commonly known as: Eliquis   Take 5 mg by mouth 2 times a day.  Dose: 5 mg     DULoxetine 20 MG Cpep  Commonly known as: Cymbalta   Take 20 mg by mouth every day.  Dose: 20 mg     escitalopram 20 MG tablet  Commonly known as: Lexapro   Take 20 mg by mouth every day.  Dose: 20 mg     ezetimibe-simvastatin 10-40 MG per tablet  Commonly known as: Vytorin   Take 1 Tablet by mouth every evening.  Dose: 1 Tablet     gabapentin 300 MG Caps  Commonly known as: Neurontin   Take 300 mg by mouth 3 times a day.  Dose: 300 mg     pantoprazole 40 MG Tbec  Commonly known as: Protonix   Take 40 mg by mouth every day.  Dose: 40 mg     potassium chloride ER 10 MEQ tablet  Commonly known as: Klor-Con   Take 10 mEq by mouth every day.  Dose: 10 mEq            STOP taking these medications      dilTIAZem  MG Cp24  Commonly known as: Cardizem CD              Allergies  Allergies[1]    DIET  Orders Placed This Encounter   Procedures    Diet Order Diet: Level 6 - Soft and Bite Sized; Liquid level: Level 0 - Thin     Standing Status:   Standing     Number of Occurrences:   1     Diet::   Level 6 - Soft and Bite Sized [23]     Liquid level:   Level 0 - Thin       ACTIVITY  As tolerated.  Weight bearing as tolerated    CONSULTATIONS  Critical care    PROCEDURES  None    LABORATORY  Lab Results   Component Value Date    SODIUM 137 07/19/2025    POTASSIUM 4.4 07/19/2025    CHLORIDE 102 07/19/2025    CO2 25 07/19/2025    GLUCOSE  100 (H) 07/19/2025    BUN 10 07/19/2025    CREATININE 0.85 07/19/2025        Lab Results   Component Value Date    WBC 9.5 07/18/2025    HEMOGLOBIN 14.6 07/18/2025    HEMATOCRIT 44.1 07/18/2025    PLATELETCT 250 07/18/2025      DX-OUTSIDE IMAGES-DX CHEST   Final Result            Total time of the discharge process exceeds 39 minutes.         [1] No Known Allergies

## 2025-07-19 NOTE — PROGRESS NOTES
0715 assumed care. Resting in bed and in no distress. Bed in low position, call light and bedside table w/in reach. Strip bed alarm on

## 2025-07-19 NOTE — PROGRESS NOTES
Monitor summary:        Rhythm: a fib/a flut  Rate:   Ectopy: (r)PVC  Measurements: /.09/.35        12hr chart check

## 2025-07-19 NOTE — THERAPY
Physical Therapy   Initial Evaluation     Patient Name:  Pamela Costa  Age:  85 y.o., Sex:  male  Medical Record #:  [id number]  Today's Date: 7/19/2025          Assessment  Pt presents with impaired gait mechanics associated with chief complaint of N/V in setting of afib with RVR and HTN with DKA vs starvation ketosis. Pt is very pleasant and cooperative; able to ambulate community distances with FWW and good insight; he did have positive talk test at end of 500ft noted to have HR in 130s (per NIH off BB HR max 150, 85% goal 128); functionally, pt is supervision with FWW and recommend dc home when medically appropriate to do so; should not need PT follow up at dc; will follow to ensure dc plan.     Plan    Physical Therapy Initial Treatment Plan   Treatment Plan : Bed Mobility, Equipment, Gait Training, Manual Therapy, Neuro Re-Education / Balance, Self Care / Home Evaluation, Stair Training, Therapeutic Activities, Therapeutic Exercise  Treatment Frequency: 3 Times per Week  Duration: Until Therapy Goals Met    DC Equipment Recommendations: None  Discharge Recommendations: Anticipate that the patient will have no further physical therapy needs after discharge from the hospital       Abridged Subjective/Objective     07/18/25 1520   Prior Living Situation   Prior Services None   Housing / Facility 1 Story House   Steps Into Home 3   Bathroom Set up Walk In Shower   Equipment Owned 4-Wheel Walker   Lives with - Patient's Self Care Capacity Alone and Able to Care For Self   Comments denies falls; very active   Prior Level of Functional Mobility   Bed Mobility Independent   Transfer Status Independent   Ambulation Independent   Ambulation Distance to tolerance   Assistive Devices Used Front-Wheel Walker   Cognition    Cognition / Consciousness WDL   Level of Consciousness Alert   Comments pleasant and cooperative; frequently joking   Passive ROM Lower Body   Passive ROM Lower Body WDL   Strength Lower Body   Lower  Body Strength  WDL   Sensation Lower Body   Lower Extremity Sensation   WDL   Balance Assessment   Sitting Balance (Static) Good   Sitting Balance (Dynamic) Good   Standing Balance (Static) Fair +   Standing Balance (Dynamic) Fair +   Weight Shift Sitting Good   Weight Shift Standing Fair   Comments B UE support in sitting/standing; no loss of balance   Bed Mobility    Supine to Sit   (NT, on side of bed upon entry)   Sit to Supine Supervised   Gait Analysis   Gait Level Of Assist Supervised   Assistive Device Front Wheel Walker   Distance (Feet) 500   # of Times Distance was Traveled 1   Deviation Increased Base Of Support   Weight Bearing Status wbat   Vision Deficits Impacting Mobility denies   Comments distance limited by therapist due to 'talk test'   Functional Mobility   Sit to Stand Supervised   Bed, Chair, Wheelchair Transfer Supervised   Short Term Goals    Short Term Goal # 1 Pt will ascend/descend 3 steps with bilateral UE support and supervision within 6 visits to ensure entry/exit of home.   Education Group   Role of Physical Therapist Patient Response Patient;Acceptance;Explanation;Demonstration;Verbal Demonstration;Action Demonstration   Use of Assistive Device Patient Response Patient;Acceptance;Explanation;Demonstration;Verbal Demonstration;Action Demonstration

## 2025-07-22 LAB
BACTERIA BLD CULT: NORMAL
BACTERIA BLD CULT: NORMAL
SIGNIFICANT IND 70042: NORMAL
SIGNIFICANT IND 70042: NORMAL
SITE SITE: NORMAL
SITE SITE: NORMAL
SOURCE SOURCE: NORMAL
SOURCE SOURCE: NORMAL

## 2025-08-21 ENCOUNTER — HOSPITAL ENCOUNTER (INPATIENT)
Facility: MEDICAL CENTER | Age: 85
LOS: 3 days | DRG: 641 | End: 2025-08-24
Attending: EMERGENCY MEDICINE | Admitting: HOSPITALIST
Payer: MEDICARE

## 2025-08-21 ENCOUNTER — APPOINTMENT (OUTPATIENT)
Dept: RADIOLOGY | Facility: MEDICAL CENTER | Age: 85
DRG: 641 | End: 2025-08-21
Attending: EMERGENCY MEDICINE
Payer: MEDICARE

## 2025-08-21 DIAGNOSIS — E87.6 HYPOKALEMIA: ICD-10-CM

## 2025-08-21 DIAGNOSIS — R11.2 NAUSEA AND VOMITING, UNSPECIFIED VOMITING TYPE: ICD-10-CM

## 2025-08-21 DIAGNOSIS — R31.29 MICROSCOPIC HEMATURIA: ICD-10-CM

## 2025-08-21 DIAGNOSIS — I48.92 ATRIAL FLUTTER WITH RAPID VENTRICULAR RESPONSE (HCC): Primary | ICD-10-CM

## 2025-08-21 DIAGNOSIS — E87.20 LACTIC ACIDOSIS: ICD-10-CM

## 2025-08-21 DIAGNOSIS — E86.0 DEHYDRATION: ICD-10-CM

## 2025-08-21 DIAGNOSIS — I48.91 ATRIAL FIBRILLATION WITH RAPID VENTRICULAR RESPONSE (HCC): ICD-10-CM

## 2025-08-21 PROBLEM — Z66 DNR (DO NOT RESUSCITATE): Status: ACTIVE | Noted: 2025-08-21

## 2025-08-21 PROBLEM — D72.829 LEUKOCYTOSIS: Status: ACTIVE | Noted: 2025-08-21

## 2025-08-21 PROBLEM — E11.9 DIABETES MELLITUS, TYPE II (HCC): Status: ACTIVE | Noted: 2025-08-21

## 2025-08-21 LAB
ALBUMIN SERPL BCP-MCNC: 4.9 G/DL (ref 3.2–4.9)
ALBUMIN/GLOB SERPL: 1.3 G/DL
ALP SERPL-CCNC: 100 U/L (ref 30–99)
ALT SERPL-CCNC: 24 U/L (ref 2–50)
ANION GAP SERPL CALC-SCNC: 22 MMOL/L (ref 7–16)
APPEARANCE UR: ABNORMAL
AST SERPL-CCNC: 45 U/L (ref 12–45)
B-OH-BUTYR SERPL-MCNC: 0.32 MMOL/L (ref 0.02–0.27)
BACTERIA #/AREA URNS HPF: ABNORMAL /HPF
BASE EXCESS BLDV CALC-SCNC: -1 MMOL/L (ref -2–3)
BASOPHILS # BLD AUTO: 0.3 % (ref 0–1.8)
BASOPHILS # BLD: 0.04 K/UL (ref 0–0.12)
BILIRUB SERPL-MCNC: 1.3 MG/DL (ref 0.1–1.5)
BILIRUB UR QL STRIP.AUTO: NEGATIVE
BODY TEMPERATURE: 35.6 CENTIGRADE
BUN SERPL-MCNC: 23 MG/DL (ref 8–22)
CALCIUM ALBUM COR SERPL-MCNC: 10 MG/DL (ref 8.5–10.5)
CALCIUM SERPL-MCNC: 10.7 MG/DL (ref 8.5–10.5)
CASTS URNS QL MICRO: >20 /LPF (ref 0–2)
CHLORIDE SERPL-SCNC: 97 MMOL/L (ref 96–112)
CO2 SERPL-SCNC: 19 MMOL/L (ref 20–33)
COLOR UR: ABNORMAL
CREAT SERPL-MCNC: 1.15 MG/DL (ref 0.5–1.4)
EKG IMPRESSION: NORMAL
EKG IMPRESSION: NORMAL
EOSINOPHIL # BLD AUTO: 0 K/UL (ref 0–0.51)
EOSINOPHIL NFR BLD: 0 % (ref 0–6.9)
EPITHELIAL CELLS 1715: ABNORMAL /HPF (ref 0–5)
ERYTHROCYTE [DISTWIDTH] IN BLOOD BY AUTOMATED COUNT: 49.5 FL (ref 35.9–50)
EST. AVERAGE GLUCOSE BLD GHB EST-MCNC: 114 MG/DL
GFR SERPLBLD CREATININE-BSD FMLA CKD-EPI: 63 ML/MIN/1.73 M 2
GLOBULIN SER CALC-MCNC: 3.8 G/DL (ref 1.9–3.5)
GLUCOSE BLD STRIP.AUTO-MCNC: 127 MG/DL (ref 65–99)
GLUCOSE SERPL-MCNC: 215 MG/DL (ref 65–99)
GLUCOSE UR STRIP.AUTO-MCNC: 250 MG/DL
HBA1C MFR BLD: 5.6 % (ref 4–5.6)
HCO3 BLDV-SCNC: 21 MMOL/L (ref 22–29)
HCT VFR BLD AUTO: 48.6 % (ref 42–52)
HGB BLD-MCNC: 17 G/DL (ref 14–18)
HYALINE CAST   1831: PRESENT /LPF
IMM GRANULOCYTES # BLD AUTO: 0.11 K/UL (ref 0–0.11)
IMM GRANULOCYTES NFR BLD AUTO: 0.7 % (ref 0–0.9)
KETONES UR STRIP.AUTO-MCNC: ABNORMAL MG/DL
LACTATE SERPL-SCNC: 3 MMOL/L (ref 0.5–2)
LACTATE SERPL-SCNC: 3.1 MMOL/L (ref 0.5–2)
LACTATE SERPL-SCNC: 5.4 MMOL/L (ref 0.5–2)
LEUKOCYTE ESTERASE UR QL STRIP.AUTO: ABNORMAL
LIPASE SERPL-CCNC: 29 U/L (ref 11–82)
LYMPHOCYTES # BLD AUTO: 0.9 K/UL (ref 1–4.8)
LYMPHOCYTES NFR BLD: 5.8 % (ref 22–41)
MAGNESIUM SERPL-MCNC: 1.9 MG/DL (ref 1.5–2.5)
MCH RBC QN AUTO: 33.5 PG (ref 27–33)
MCHC RBC AUTO-ENTMCNC: 35 G/DL (ref 32.3–36.5)
MCV RBC AUTO: 95.9 FL (ref 81.4–97.8)
MICRO URNS: ABNORMAL
MONOCYTES # BLD AUTO: 0.99 K/UL (ref 0–0.85)
MONOCYTES NFR BLD AUTO: 6.4 % (ref 0–13.4)
NEUTROPHILS # BLD AUTO: 13.5 K/UL (ref 1.82–7.42)
NEUTROPHILS NFR BLD: 86.8 % (ref 44–72)
NITRITE UR QL STRIP.AUTO: NEGATIVE
NRBC # BLD AUTO: 0 K/UL
NRBC BLD-RTO: 0 /100 WBC (ref 0–0.2)
NT-PROBNP SERPL IA-MCNC: ABNORMAL PG/ML (ref 0–125)
PCO2 BLDV: 28 MMHG (ref 38–54)
PCO2 TEMP ADJ BLDV: 26.3 MMHG
PH BLDV: 7.47 [PH] (ref 7.31–7.45)
PH TEMP ADJ BLDV: 7.49 [PH] (ref 7.31–7.45)
PH UR STRIP.AUTO: 5.5 [PH] (ref 5–8)
PLATELET # BLD AUTO: 352 K/UL (ref 164–446)
PMV BLD AUTO: 9.8 FL (ref 9–12.9)
PO2 BLDV: 35.9 MMHG (ref 23–48)
PO2 TEMP ADJ BLDV: 32.5 MMHG (ref 23–48)
POTASSIUM SERPL-SCNC: 3.6 MMOL/L (ref 3.6–5.5)
PROCALCITONIN SERPL-MCNC: 0.04 NG/ML
PROT SERPL-MCNC: 8.7 G/DL (ref 6–8.2)
PROT UR QL STRIP: 300 MG/DL
RBC # BLD AUTO: 5.07 M/UL (ref 4.7–6.1)
RBC # URNS HPF: ABNORMAL /HPF
RBC UR QL AUTO: ABNORMAL
SAO2 % BLDV: 56 % (ref 60–85)
SODIUM SERPL-SCNC: 138 MMOL/L (ref 135–145)
SP GR UR STRIP.AUTO: 1.02
T4 FREE SERPL-MCNC: 1.51 NG/DL (ref 0.93–1.7)
TROPONIN T SERPL-MCNC: 18 NG/L (ref 6–19)
TROPONIN T SERPL-MCNC: 27 NG/L (ref 6–19)
TSH SERPL DL<=0.005 MIU/L-ACNC: 2.54 UIU/ML (ref 0.38–5.33)
UROBILINOGEN UR STRIP.AUTO-MCNC: 1 EU/DL
WBC # BLD AUTO: 15.5 K/UL (ref 4.8–10.8)
WBC #/AREA URNS HPF: ABNORMAL /HPF

## 2025-08-21 PROCEDURE — 36415 COLL VENOUS BLD VENIPUNCTURE: CPT

## 2025-08-21 PROCEDURE — 700102 HCHG RX REV CODE 250 W/ 637 OVERRIDE(OP): Performed by: HOSPITALIST

## 2025-08-21 PROCEDURE — 84145 PROCALCITONIN (PCT): CPT

## 2025-08-21 PROCEDURE — 96375 TX/PRO/DX INJ NEW DRUG ADDON: CPT

## 2025-08-21 PROCEDURE — 82803 BLOOD GASES ANY COMBINATION: CPT

## 2025-08-21 PROCEDURE — 93005 ELECTROCARDIOGRAM TRACING: CPT | Mod: TC | Performed by: EMERGENCY MEDICINE

## 2025-08-21 PROCEDURE — 700111 HCHG RX REV CODE 636 W/ 250 OVERRIDE (IP): Performed by: HOSPITALIST

## 2025-08-21 PROCEDURE — 770020 HCHG ROOM/CARE - TELE (206)

## 2025-08-21 PROCEDURE — 81001 URINALYSIS AUTO W/SCOPE: CPT

## 2025-08-21 PROCEDURE — 82010 KETONE BODYS QUAN: CPT

## 2025-08-21 PROCEDURE — 94760 N-INVAS EAR/PLS OXIMETRY 1: CPT

## 2025-08-21 PROCEDURE — 83690 ASSAY OF LIPASE: CPT

## 2025-08-21 PROCEDURE — A9270 NON-COVERED ITEM OR SERVICE: HCPCS | Performed by: HOSPITALIST

## 2025-08-21 PROCEDURE — 71045 X-RAY EXAM CHEST 1 VIEW: CPT

## 2025-08-21 PROCEDURE — 84484 ASSAY OF TROPONIN QUANT: CPT | Mod: 91

## 2025-08-21 PROCEDURE — 99285 EMERGENCY DEPT VISIT HI MDM: CPT

## 2025-08-21 PROCEDURE — 96374 THER/PROPH/DIAG INJ IV PUSH: CPT

## 2025-08-21 PROCEDURE — 84439 ASSAY OF FREE THYROXINE: CPT

## 2025-08-21 PROCEDURE — 85025 COMPLETE CBC W/AUTO DIFF WBC: CPT

## 2025-08-21 PROCEDURE — 83605 ASSAY OF LACTIC ACID: CPT | Mod: 91

## 2025-08-21 PROCEDURE — 82962 GLUCOSE BLOOD TEST: CPT | Performed by: HOSPITALIST

## 2025-08-21 PROCEDURE — 700105 HCHG RX REV CODE 258: Performed by: EMERGENCY MEDICINE

## 2025-08-21 PROCEDURE — 700111 HCHG RX REV CODE 636 W/ 250 OVERRIDE (IP): Mod: JZ | Performed by: EMERGENCY MEDICINE

## 2025-08-21 PROCEDURE — 96376 TX/PRO/DX INJ SAME DRUG ADON: CPT

## 2025-08-21 PROCEDURE — 83036 HEMOGLOBIN GLYCOSYLATED A1C: CPT

## 2025-08-21 PROCEDURE — 99223 1ST HOSP IP/OBS HIGH 75: CPT | Mod: AI | Performed by: HOSPITALIST

## 2025-08-21 PROCEDURE — 87077 CULTURE AEROBIC IDENTIFY: CPT

## 2025-08-21 PROCEDURE — 83735 ASSAY OF MAGNESIUM: CPT

## 2025-08-21 PROCEDURE — 700105 HCHG RX REV CODE 258: Performed by: HOSPITALIST

## 2025-08-21 PROCEDURE — 80053 COMPREHEN METABOLIC PANEL: CPT

## 2025-08-21 PROCEDURE — 87040 BLOOD CULTURE FOR BACTERIA: CPT

## 2025-08-21 PROCEDURE — 83880 ASSAY OF NATRIURETIC PEPTIDE: CPT

## 2025-08-21 PROCEDURE — 84443 ASSAY THYROID STIM HORMONE: CPT

## 2025-08-21 PROCEDURE — 87086 URINE CULTURE/COLONY COUNT: CPT

## 2025-08-21 RX ORDER — AMOXICILLIN 250 MG
2 CAPSULE ORAL EVERY EVENING
Status: DISCONTINUED | OUTPATIENT
Start: 2025-08-21 | End: 2025-08-24 | Stop reason: HOSPADM

## 2025-08-21 RX ORDER — OXYCODONE HYDROCHLORIDE 5 MG/1
2.5 TABLET ORAL
Refills: 0 | Status: DISCONTINUED | OUTPATIENT
Start: 2025-08-21 | End: 2025-08-24 | Stop reason: HOSPADM

## 2025-08-21 RX ORDER — LABETALOL HYDROCHLORIDE 5 MG/ML
10 INJECTION, SOLUTION INTRAVENOUS EVERY 4 HOURS PRN
Status: DISCONTINUED | OUTPATIENT
Start: 2025-08-21 | End: 2025-08-24 | Stop reason: HOSPADM

## 2025-08-21 RX ORDER — SODIUM CHLORIDE 9 MG/ML
1000 INJECTION, SOLUTION INTRAVENOUS ONCE
Status: COMPLETED | OUTPATIENT
Start: 2025-08-21 | End: 2025-08-21

## 2025-08-21 RX ORDER — SODIUM CHLORIDE 9 MG/ML
INJECTION, SOLUTION INTRAVENOUS CONTINUOUS
Status: ACTIVE | OUTPATIENT
Start: 2025-08-21 | End: 2025-08-22

## 2025-08-21 RX ORDER — DEXTROSE MONOHYDRATE 25 G/50ML
25 INJECTION, SOLUTION INTRAVENOUS
Status: DISCONTINUED | OUTPATIENT
Start: 2025-08-21 | End: 2025-08-24 | Stop reason: HOSPADM

## 2025-08-21 RX ORDER — DILTIAZEM HYDROCHLORIDE 5 MG/ML
20 INJECTION INTRAVENOUS ONCE
Status: COMPLETED | OUTPATIENT
Start: 2025-08-21 | End: 2025-08-21

## 2025-08-21 RX ORDER — POTASSIUM CHLORIDE 1500 MG/1
10 TABLET, EXTENDED RELEASE ORAL DAILY
Status: DISCONTINUED | OUTPATIENT
Start: 2025-08-22 | End: 2025-08-24 | Stop reason: HOSPADM

## 2025-08-21 RX ORDER — DULOXETIN HYDROCHLORIDE 20 MG/1
20 CAPSULE, DELAYED RELEASE ORAL DAILY
Status: DISCONTINUED | OUTPATIENT
Start: 2025-08-22 | End: 2025-08-24 | Stop reason: HOSPADM

## 2025-08-21 RX ORDER — DILTIAZEM HYDROCHLORIDE 5 MG/ML
20 INJECTION INTRAVENOUS ONCE
Status: DISCONTINUED | OUTPATIENT
Start: 2025-08-21 | End: 2025-08-21

## 2025-08-21 RX ORDER — METOPROLOL SUCCINATE 25 MG/1
50 TABLET, EXTENDED RELEASE ORAL 2 TIMES DAILY
Status: DISCONTINUED | OUTPATIENT
Start: 2025-08-21 | End: 2025-08-23

## 2025-08-21 RX ORDER — LABETALOL HYDROCHLORIDE 5 MG/ML
10 INJECTION, SOLUTION INTRAVENOUS ONCE
Status: COMPLETED | OUTPATIENT
Start: 2025-08-21 | End: 2025-08-21

## 2025-08-21 RX ORDER — SIMVASTATIN 20 MG
40 TABLET ORAL EVERY EVENING
Status: DISCONTINUED | OUTPATIENT
Start: 2025-08-22 | End: 2025-08-24 | Stop reason: HOSPADM

## 2025-08-21 RX ORDER — ACETAMINOPHEN 325 MG/1
650 TABLET ORAL EVERY 6 HOURS PRN
Status: DISCONTINUED | OUTPATIENT
Start: 2025-08-21 | End: 2025-08-24 | Stop reason: HOSPADM

## 2025-08-21 RX ORDER — OMEPRAZOLE 20 MG/1
20 CAPSULE, DELAYED RELEASE ORAL DAILY
Status: DISCONTINUED | OUTPATIENT
Start: 2025-08-22 | End: 2025-08-24 | Stop reason: HOSPADM

## 2025-08-21 RX ORDER — POLYETHYLENE GLYCOL 3350 17 G/17G
1 POWDER, FOR SOLUTION ORAL
Status: DISCONTINUED | OUTPATIENT
Start: 2025-08-21 | End: 2025-08-24 | Stop reason: HOSPADM

## 2025-08-21 RX ORDER — ONDANSETRON 4 MG/1
4 TABLET, ORALLY DISINTEGRATING ORAL EVERY 4 HOURS PRN
Status: DISCONTINUED | OUTPATIENT
Start: 2025-08-21 | End: 2025-08-24 | Stop reason: HOSPADM

## 2025-08-21 RX ORDER — ONDANSETRON 2 MG/ML
4 INJECTION INTRAMUSCULAR; INTRAVENOUS EVERY 4 HOURS PRN
Status: DISCONTINUED | OUTPATIENT
Start: 2025-08-21 | End: 2025-08-24 | Stop reason: HOSPADM

## 2025-08-21 RX ORDER — EZETIMIBE 10 MG/1
10 TABLET ORAL EVERY EVENING
Status: DISCONTINUED | OUTPATIENT
Start: 2025-08-22 | End: 2025-08-24 | Stop reason: HOSPADM

## 2025-08-21 RX ORDER — ESCITALOPRAM OXALATE 10 MG/1
20 TABLET ORAL DAILY
Status: DISCONTINUED | OUTPATIENT
Start: 2025-08-22 | End: 2025-08-24 | Stop reason: HOSPADM

## 2025-08-21 RX ORDER — CEFTRIAXONE 2 G/1
2000 INJECTION, POWDER, FOR SOLUTION INTRAMUSCULAR; INTRAVENOUS ONCE
Status: COMPLETED | OUTPATIENT
Start: 2025-08-21 | End: 2025-08-21

## 2025-08-21 RX ORDER — MORPHINE SULFATE 4 MG/ML
2 INJECTION INTRAVENOUS
Status: DISCONTINUED | OUTPATIENT
Start: 2025-08-21 | End: 2025-08-24 | Stop reason: HOSPADM

## 2025-08-21 RX ORDER — GABAPENTIN 300 MG/1
300 CAPSULE ORAL 3 TIMES DAILY
Status: DISCONTINUED | OUTPATIENT
Start: 2025-08-21 | End: 2025-08-24 | Stop reason: HOSPADM

## 2025-08-21 RX ORDER — EZETIMIBE AND SIMVASTATIN 10; 40 MG/1; MG/1
1 TABLET ORAL NIGHTLY
Status: DISCONTINUED | OUTPATIENT
Start: 2025-08-21 | End: 2025-08-21

## 2025-08-21 RX ORDER — INSULIN LISPRO 100 [IU]/ML
2-9 INJECTION, SOLUTION INTRAVENOUS; SUBCUTANEOUS
Status: DISCONTINUED | OUTPATIENT
Start: 2025-08-21 | End: 2025-08-24 | Stop reason: HOSPADM

## 2025-08-21 RX ORDER — METOPROLOL TARTRATE 1 MG/ML
5 INJECTION, SOLUTION INTRAVENOUS
Status: DISCONTINUED | OUTPATIENT
Start: 2025-08-21 | End: 2025-08-24 | Stop reason: HOSPADM

## 2025-08-21 RX ORDER — OXYCODONE HYDROCHLORIDE 5 MG/1
5 TABLET ORAL
Refills: 0 | Status: DISCONTINUED | OUTPATIENT
Start: 2025-08-21 | End: 2025-08-24 | Stop reason: HOSPADM

## 2025-08-21 RX ADMIN — DILTIAZEM HYDROCHLORIDE 20 MG: 5 INJECTION INTRAVENOUS at 15:54

## 2025-08-21 RX ADMIN — METOPROLOL SUCCINATE 50 MG: 25 TABLET, EXTENDED RELEASE ORAL at 18:53

## 2025-08-21 RX ADMIN — SODIUM CHLORIDE 1000 ML: 9 INJECTION, SOLUTION INTRAVENOUS at 14:01

## 2025-08-21 RX ADMIN — LABETALOL HYDROCHLORIDE 10 MG: 5 INJECTION INTRAVENOUS at 17:16

## 2025-08-21 RX ADMIN — SODIUM CHLORIDE 1000 ML: 9 INJECTION, SOLUTION INTRAVENOUS at 14:45

## 2025-08-21 RX ADMIN — APIXABAN 5 MG: 5 TABLET, FILM COATED ORAL at 18:53

## 2025-08-21 RX ADMIN — CEFTRIAXONE SODIUM 2000 MG: 2 INJECTION, POWDER, FOR SOLUTION INTRAMUSCULAR; INTRAVENOUS at 15:08

## 2025-08-21 RX ADMIN — SODIUM CHLORIDE: 9 INJECTION, SOLUTION INTRAVENOUS at 21:40

## 2025-08-21 RX ADMIN — LABETALOL HYDROCHLORIDE 10 MG: 5 INJECTION INTRAVENOUS at 20:34

## 2025-08-21 RX ADMIN — GABAPENTIN 300 MG: 300 CAPSULE ORAL at 20:23

## 2025-08-21 RX ADMIN — SENNOSIDES AND DOCUSATE SODIUM 2 TABLET: 50; 8.6 TABLET ORAL at 18:53

## 2025-08-21 RX ADMIN — DILTIAZEM HYDROCHLORIDE 20 MG: 5 INJECTION INTRAVENOUS at 16:30

## 2025-08-21 ASSESSMENT — ENCOUNTER SYMPTOMS
ABDOMINAL PAIN: 0
BLOOD IN STOOL: 0
WEIGHT LOSS: 1
DIARRHEA: 0
PSYCHIATRIC NEGATIVE: 1
VOMITING: 1
COUGH: 0
WHEEZING: 0
NAUSEA: 1
EYES NEGATIVE: 1
BRUISES/BLEEDS EASILY: 0
CHILLS: 0
FEVER: 0
HEARTBURN: 0
WEAKNESS: 1
DOUBLE VISION: 0
CONSTIPATION: 0
DIAPHORESIS: 0
LOSS OF CONSCIOUSNESS: 0
FOCAL WEAKNESS: 0
MUSCULOSKELETAL NEGATIVE: 1
SEIZURES: 0
SHORTNESS OF BREATH: 1
NERVOUS/ANXIOUS: 0
HEADACHES: 0
HEMOPTYSIS: 0
PALPITATIONS: 1
DIZZINESS: 0

## 2025-08-21 ASSESSMENT — LIFESTYLE VARIABLES
HAVE PEOPLE ANNOYED YOU BY CRITICIZING YOUR DRINKING: NO
HAVE YOU EVER FELT YOU SHOULD CUT DOWN ON YOUR DRINKING: NO
AVERAGE NUMBER OF DAYS PER WEEK YOU HAVE A DRINK CONTAINING ALCOHOL: 0
DOES PATIENT WANT TO STOP DRINKING: NO
EVER HAD A DRINK FIRST THING IN THE MORNING TO STEADY YOUR NERVES TO GET RID OF A HANGOVER: NO
TOTAL SCORE: 0
TOTAL SCORE: 0
EVER FELT BAD OR GUILTY ABOUT YOUR DRINKING: NO
HOW OFTEN DO YOU HAVE A DRINK CONTAINING ALCOHOL: 4 OR MORE TIMES A WEEK
ALCOHOL_USE: NO
TOTAL SCORE: 0
CONSUMPTION TOTAL: NEGATIVE
HOW MANY TIMES IN THE PAST YEAR HAVE YOU HAD 5 OR MORE DRINKS IN A DAY: 0
ON A TYPICAL DAY WHEN YOU DRINK ALCOHOL HOW MANY DRINKS DO YOU HAVE: 0

## 2025-08-21 ASSESSMENT — PATIENT HEALTH QUESTIONNAIRE - PHQ9
2. FEELING DOWN, DEPRESSED, IRRITABLE, OR HOPELESS: NOT AT ALL
1. LITTLE INTEREST OR PLEASURE IN DOING THINGS: NOT AT ALL
SUM OF ALL RESPONSES TO PHQ9 QUESTIONS 1 AND 2: 0

## 2025-08-21 ASSESSMENT — PAIN DESCRIPTION - PAIN TYPE
TYPE: ACUTE PAIN
TYPE: ACUTE PAIN

## 2025-08-21 ASSESSMENT — FIBROSIS 4 INDEX
FIB4 SCORE: 2.17
FIB4 SCORE: 2.71
FIB4 SCORE: 2.17

## 2025-08-22 ENCOUNTER — APPOINTMENT (OUTPATIENT)
Dept: CARDIOLOGY | Facility: MEDICAL CENTER | Age: 85
DRG: 641 | End: 2025-08-22
Attending: HOSPITALIST
Payer: MEDICARE

## 2025-08-22 LAB
ANION GAP SERPL CALC-SCNC: 12 MMOL/L (ref 7–16)
ANION GAP SERPL CALC-SCNC: 14 MMOL/L (ref 7–16)
BUN SERPL-MCNC: 17 MG/DL (ref 8–22)
BUN SERPL-MCNC: 17 MG/DL (ref 8–22)
CALCIUM SERPL-MCNC: 8.8 MG/DL (ref 8.5–10.5)
CALCIUM SERPL-MCNC: 9 MG/DL (ref 8.5–10.5)
CHLORIDE SERPL-SCNC: 103 MMOL/L (ref 96–112)
CHLORIDE SERPL-SCNC: 105 MMOL/L (ref 96–112)
CO2 SERPL-SCNC: 20 MMOL/L (ref 20–33)
CO2 SERPL-SCNC: 20 MMOL/L (ref 20–33)
CREAT SERPL-MCNC: 0.57 MG/DL (ref 0.5–1.4)
CREAT SERPL-MCNC: 0.59 MG/DL (ref 0.5–1.4)
EKG IMPRESSION: NORMAL
ERYTHROCYTE [DISTWIDTH] IN BLOOD BY AUTOMATED COUNT: 52.2 FL (ref 35.9–50)
GFR SERPLBLD CREATININE-BSD FMLA CKD-EPI: 96 ML/MIN/1.73 M 2
GFR SERPLBLD CREATININE-BSD FMLA CKD-EPI: 97 ML/MIN/1.73 M 2
GLUCOSE BLD STRIP.AUTO-MCNC: 104 MG/DL (ref 65–99)
GLUCOSE BLD STRIP.AUTO-MCNC: 104 MG/DL (ref 65–99)
GLUCOSE BLD STRIP.AUTO-MCNC: 114 MG/DL (ref 65–99)
GLUCOSE BLD STRIP.AUTO-MCNC: 118 MG/DL (ref 65–99)
GLUCOSE SERPL-MCNC: 112 MG/DL (ref 65–99)
GLUCOSE SERPL-MCNC: 119 MG/DL (ref 65–99)
HCT VFR BLD AUTO: 41.1 % (ref 42–52)
HGB BLD-MCNC: 14 G/DL (ref 14–18)
LACTATE SERPL-SCNC: 1.6 MMOL/L (ref 0.5–2)
LV EJECT FRACT  99904: 65
MAGNESIUM SERPL-MCNC: 2 MG/DL (ref 1.5–2.5)
MCH RBC QN AUTO: 32.9 PG (ref 27–33)
MCHC RBC AUTO-ENTMCNC: 34.1 G/DL (ref 32.3–36.5)
MCV RBC AUTO: 96.7 FL (ref 81.4–97.8)
PLATELET # BLD AUTO: 267 K/UL (ref 164–446)
PMV BLD AUTO: 9.9 FL (ref 9–12.9)
POTASSIUM SERPL-SCNC: 3.2 MMOL/L (ref 3.6–5.5)
POTASSIUM SERPL-SCNC: 3.7 MMOL/L (ref 3.6–5.5)
RBC # BLD AUTO: 4.25 M/UL (ref 4.7–6.1)
SODIUM SERPL-SCNC: 137 MMOL/L (ref 135–145)
SODIUM SERPL-SCNC: 137 MMOL/L (ref 135–145)
TROPONIN T SERPL-MCNC: 21 NG/L (ref 6–19)
TROPONIN T SERPL-MCNC: 24 NG/L (ref 6–19)
WBC # BLD AUTO: 13.3 K/UL (ref 4.8–10.8)

## 2025-08-22 PROCEDURE — 83605 ASSAY OF LACTIC ACID: CPT

## 2025-08-22 PROCEDURE — 82962 GLUCOSE BLOOD TEST: CPT | Performed by: HOSPITALIST

## 2025-08-22 PROCEDURE — 700105 HCHG RX REV CODE 258: Performed by: HOSPITALIST

## 2025-08-22 PROCEDURE — A9270 NON-COVERED ITEM OR SERVICE: HCPCS | Performed by: HOSPITALIST

## 2025-08-22 PROCEDURE — 93010 ELECTROCARDIOGRAM REPORT: CPT | Performed by: INTERNAL MEDICINE

## 2025-08-22 PROCEDURE — 770020 HCHG ROOM/CARE - TELE (206)

## 2025-08-22 PROCEDURE — 93306 TTE W/DOPPLER COMPLETE: CPT

## 2025-08-22 PROCEDURE — 94760 N-INVAS EAR/PLS OXIMETRY 1: CPT

## 2025-08-22 PROCEDURE — 700117 HCHG RX CONTRAST REV CODE 255: Performed by: HOSPITALIST

## 2025-08-22 PROCEDURE — 36415 COLL VENOUS BLD VENIPUNCTURE: CPT

## 2025-08-22 PROCEDURE — 99233 SBSQ HOSP IP/OBS HIGH 50: CPT | Performed by: HOSPITALIST

## 2025-08-22 PROCEDURE — 85027 COMPLETE CBC AUTOMATED: CPT

## 2025-08-22 PROCEDURE — 93005 ELECTROCARDIOGRAM TRACING: CPT | Mod: TC | Performed by: HOSPITALIST

## 2025-08-22 PROCEDURE — 700102 HCHG RX REV CODE 250 W/ 637 OVERRIDE(OP): Performed by: HOSPITALIST

## 2025-08-22 PROCEDURE — 93306 TTE W/DOPPLER COMPLETE: CPT | Mod: 26 | Performed by: INTERNAL MEDICINE

## 2025-08-22 PROCEDURE — 83735 ASSAY OF MAGNESIUM: CPT

## 2025-08-22 PROCEDURE — 80048 BASIC METABOLIC PNL TOTAL CA: CPT

## 2025-08-22 PROCEDURE — 97535 SELF CARE MNGMENT TRAINING: CPT

## 2025-08-22 PROCEDURE — 84484 ASSAY OF TROPONIN QUANT: CPT

## 2025-08-22 PROCEDURE — 97166 OT EVAL MOD COMPLEX 45 MIN: CPT

## 2025-08-22 RX ORDER — SODIUM CHLORIDE, SODIUM LACTATE, POTASSIUM CHLORIDE, CALCIUM CHLORIDE 600; 310; 30; 20 MG/100ML; MG/100ML; MG/100ML; MG/100ML
INJECTION, SOLUTION INTRAVENOUS CONTINUOUS
Status: ACTIVE | OUTPATIENT
Start: 2025-08-22 | End: 2025-08-23

## 2025-08-22 RX ORDER — METOPROLOL SUCCINATE 25 MG/1
25 TABLET, EXTENDED RELEASE ORAL ONCE
Status: COMPLETED | OUTPATIENT
Start: 2025-08-22 | End: 2025-08-22

## 2025-08-22 RX ADMIN — POTASSIUM CHLORIDE 10 MEQ: 1500 TABLET, EXTENDED RELEASE ORAL at 06:29

## 2025-08-22 RX ADMIN — SODIUM CHLORIDE, POTASSIUM CHLORIDE, SODIUM LACTATE AND CALCIUM CHLORIDE: 600; 310; 30; 20 INJECTION, SOLUTION INTRAVENOUS at 16:09

## 2025-08-22 RX ADMIN — GABAPENTIN 300 MG: 300 CAPSULE ORAL at 21:31

## 2025-08-22 RX ADMIN — METOPROLOL SUCCINATE 25 MG: 25 TABLET, EXTENDED RELEASE ORAL at 10:08

## 2025-08-22 RX ADMIN — APIXABAN 5 MG: 5 TABLET, FILM COATED ORAL at 17:55

## 2025-08-22 RX ADMIN — LABETALOL HYDROCHLORIDE 10 MG: 5 INJECTION INTRAVENOUS at 08:37

## 2025-08-22 RX ADMIN — METOPROLOL SUCCINATE 50 MG: 25 TABLET, EXTENDED RELEASE ORAL at 17:55

## 2025-08-22 RX ADMIN — SENNOSIDES AND DOCUSATE SODIUM 2 TABLET: 50; 8.6 TABLET ORAL at 17:56

## 2025-08-22 RX ADMIN — ESCITALOPRAM OXALATE 20 MG: 10 TABLET ORAL at 06:29

## 2025-08-22 RX ADMIN — EZETIMIBE 10 MG: 10 TABLET ORAL at 17:56

## 2025-08-22 RX ADMIN — DULOXETINE 20 MG: 20 CAPSULE, DELAYED RELEASE ORAL at 06:29

## 2025-08-22 RX ADMIN — GABAPENTIN 300 MG: 300 CAPSULE ORAL at 08:37

## 2025-08-22 RX ADMIN — APIXABAN 5 MG: 5 TABLET, FILM COATED ORAL at 06:29

## 2025-08-22 RX ADMIN — GABAPENTIN 300 MG: 300 CAPSULE ORAL at 16:09

## 2025-08-22 RX ADMIN — SIMVASTATIN 40 MG: 20 TABLET, FILM COATED ORAL at 17:56

## 2025-08-22 RX ADMIN — OMEPRAZOLE 20 MG: 20 CAPSULE, DELAYED RELEASE ORAL at 06:29

## 2025-08-22 RX ADMIN — METOPROLOL SUCCINATE 50 MG: 25 TABLET, EXTENDED RELEASE ORAL at 06:29

## 2025-08-22 RX ADMIN — HUMAN ALBUMIN MICROSPHERES AND PERFLUTREN 3 ML: 10; .22 INJECTION, SOLUTION INTRAVENOUS at 14:15

## 2025-08-22 ASSESSMENT — ENCOUNTER SYMPTOMS
BRUISES/BLEEDS EASILY: 0
ABDOMINAL PAIN: 1
SHORTNESS OF BREATH: 0
EYE REDNESS: 0
STRIDOR: 0
PALPITATIONS: 1
FEVER: 0
FLANK PAIN: 0
EYE DISCHARGE: 0
COUGH: 0
NERVOUS/ANXIOUS: 0
CHILLS: 0
MYALGIAS: 0
NAUSEA: 1
FOCAL WEAKNESS: 0

## 2025-08-22 ASSESSMENT — COGNITIVE AND FUNCTIONAL STATUS - GENERAL
DAILY ACTIVITIY SCORE: 22
SUGGESTED CMS G CODE MODIFIER DAILY ACTIVITY: CJ
TOILETING: A LITTLE
MOBILITY SCORE: 18
DRESSING REGULAR UPPER BODY CLOTHING: A LITTLE
PERSONAL GROOMING: A LITTLE
HELP NEEDED FOR BATHING: A LITTLE
STANDING UP FROM CHAIR USING ARMS: A LITTLE
DRESSING REGULAR LOWER BODY CLOTHING: A LITTLE
TOILETING: A LITTLE
MOVING FROM LYING ON BACK TO SITTING ON SIDE OF FLAT BED: A LITTLE
SUGGESTED CMS G CODE MODIFIER DAILY ACTIVITY: CK
MOVING TO AND FROM BED TO CHAIR: A LITTLE
SUGGESTED CMS G CODE MODIFIER MOBILITY: CK
DAILY ACTIVITIY SCORE: 18
HELP NEEDED FOR BATHING: A LITTLE
TURNING FROM BACK TO SIDE WHILE IN FLAT BAD: A LITTLE
CLIMB 3 TO 5 STEPS WITH RAILING: A LITTLE
EATING MEALS: A LITTLE
WALKING IN HOSPITAL ROOM: A LITTLE

## 2025-08-22 ASSESSMENT — CHA2DS2 SCORE
DIABETES: YES
AGE 75 OR GREATER: YES
VASCULAR DISEASE: NO
CHF OR LEFT VENTRICULAR DYSFUNCTION: NO
HYPERTENSION: YES
AGE 65 TO 74: NO
SEX: MALE
PRIOR STROKE OR TIA OR THROMBOEMBOLISM: NO
CHA2DS2 VASC SCORE: 4

## 2025-08-22 ASSESSMENT — GAIT ASSESSMENTS: DISTANCE (FEET): 80

## 2025-08-22 ASSESSMENT — ACTIVITIES OF DAILY LIVING (ADL): TOILETING: INDEPENDENT

## 2025-08-22 ASSESSMENT — PAIN DESCRIPTION - PAIN TYPE
TYPE: ACUTE PAIN
TYPE: ACUTE PAIN

## 2025-08-23 LAB
ALBUMIN SERPL BCP-MCNC: 3.6 G/DL (ref 3.2–4.9)
ALBUMIN/GLOB SERPL: 1.6 G/DL
ALP SERPL-CCNC: 64 U/L (ref 30–99)
ALT SERPL-CCNC: 15 U/L (ref 2–50)
ANION GAP SERPL CALC-SCNC: 11 MMOL/L (ref 7–16)
AST SERPL-CCNC: 24 U/L (ref 12–45)
BACTERIA UR CULT: NORMAL
BILIRUB SERPL-MCNC: 1 MG/DL (ref 0.1–1.5)
BUN SERPL-MCNC: 17 MG/DL (ref 8–22)
CALCIUM ALBUM COR SERPL-MCNC: 9.2 MG/DL (ref 8.5–10.5)
CALCIUM SERPL-MCNC: 8.9 MG/DL (ref 8.5–10.5)
CHLORIDE SERPL-SCNC: 105 MMOL/L (ref 96–112)
CO2 SERPL-SCNC: 22 MMOL/L (ref 20–33)
CREAT SERPL-MCNC: 0.61 MG/DL (ref 0.5–1.4)
ERYTHROCYTE [DISTWIDTH] IN BLOOD BY AUTOMATED COUNT: 53.1 FL (ref 35.9–50)
GFR SERPLBLD CREATININE-BSD FMLA CKD-EPI: 95 ML/MIN/1.73 M 2
GLOBULIN SER CALC-MCNC: 2.3 G/DL (ref 1.9–3.5)
GLUCOSE BLD STRIP.AUTO-MCNC: 101 MG/DL (ref 65–99)
GLUCOSE BLD STRIP.AUTO-MCNC: 123 MG/DL (ref 65–99)
GLUCOSE BLD STRIP.AUTO-MCNC: 127 MG/DL (ref 65–99)
GLUCOSE BLD STRIP.AUTO-MCNC: 99 MG/DL (ref 65–99)
GLUCOSE SERPL-MCNC: 94 MG/DL (ref 65–99)
HCT VFR BLD AUTO: 37.5 % (ref 42–52)
HGB BLD-MCNC: 12.9 G/DL (ref 14–18)
MAGNESIUM SERPL-MCNC: 2 MG/DL (ref 1.5–2.5)
MCH RBC QN AUTO: 33.7 PG (ref 27–33)
MCHC RBC AUTO-ENTMCNC: 34.4 G/DL (ref 32.3–36.5)
MCV RBC AUTO: 97.9 FL (ref 81.4–97.8)
PLATELET # BLD AUTO: 203 K/UL (ref 164–446)
PMV BLD AUTO: 9.7 FL (ref 9–12.9)
POTASSIUM SERPL-SCNC: 3.4 MMOL/L (ref 3.6–5.5)
PROT SERPL-MCNC: 5.9 G/DL (ref 6–8.2)
RBC # BLD AUTO: 3.83 M/UL (ref 4.7–6.1)
SIGNIFICANT IND 70042: NORMAL
SITE SITE: NORMAL
SODIUM SERPL-SCNC: 138 MMOL/L (ref 135–145)
SOURCE SOURCE: NORMAL
WBC # BLD AUTO: 9.5 K/UL (ref 4.8–10.8)

## 2025-08-23 PROCEDURE — A9270 NON-COVERED ITEM OR SERVICE: HCPCS | Performed by: HOSPITALIST

## 2025-08-23 PROCEDURE — 36415 COLL VENOUS BLD VENIPUNCTURE: CPT

## 2025-08-23 PROCEDURE — 85027 COMPLETE CBC AUTOMATED: CPT

## 2025-08-23 PROCEDURE — 700102 HCHG RX REV CODE 250 W/ 637 OVERRIDE(OP): Performed by: HOSPITALIST

## 2025-08-23 PROCEDURE — 80053 COMPREHEN METABOLIC PANEL: CPT

## 2025-08-23 PROCEDURE — 770020 HCHG ROOM/CARE - TELE (206)

## 2025-08-23 PROCEDURE — 94760 N-INVAS EAR/PLS OXIMETRY 1: CPT

## 2025-08-23 PROCEDURE — 83735 ASSAY OF MAGNESIUM: CPT

## 2025-08-23 PROCEDURE — 700111 HCHG RX REV CODE 636 W/ 250 OVERRIDE (IP): Mod: JZ | Performed by: HOSPITALIST

## 2025-08-23 PROCEDURE — 82962 GLUCOSE BLOOD TEST: CPT | Performed by: HOSPITALIST

## 2025-08-23 PROCEDURE — 99233 SBSQ HOSP IP/OBS HIGH 50: CPT | Performed by: HOSPITALIST

## 2025-08-23 RX ORDER — CALCIUM CARBONATE 500 MG/1
500 TABLET, CHEWABLE ORAL DAILY
Status: DISCONTINUED | OUTPATIENT
Start: 2025-08-23 | End: 2025-08-23

## 2025-08-23 RX ORDER — METOPROLOL SUCCINATE 25 MG/1
75 TABLET, EXTENDED RELEASE ORAL 2 TIMES DAILY
Status: DISCONTINUED | OUTPATIENT
Start: 2025-08-23 | End: 2025-08-24 | Stop reason: HOSPADM

## 2025-08-23 RX ORDER — CALCIUM CARBONATE 500 MG/1
1000 TABLET, CHEWABLE ORAL EVERY 8 HOURS PRN
Status: DISCONTINUED | OUTPATIENT
Start: 2025-08-23 | End: 2025-08-24 | Stop reason: HOSPADM

## 2025-08-23 RX ORDER — HYDRALAZINE HYDROCHLORIDE 20 MG/ML
10 INJECTION INTRAMUSCULAR; INTRAVENOUS EVERY 4 HOURS PRN
Status: DISCONTINUED | OUTPATIENT
Start: 2025-08-23 | End: 2025-08-24 | Stop reason: HOSPADM

## 2025-08-23 RX ORDER — POTASSIUM CHLORIDE 1500 MG/1
20 TABLET, EXTENDED RELEASE ORAL ONCE
Status: COMPLETED | OUTPATIENT
Start: 2025-08-23 | End: 2025-08-23

## 2025-08-23 RX ORDER — CALCIUM CARBONATE 500 MG/1
500 TABLET, CHEWABLE ORAL EVERY 8 HOURS PRN
Status: DISCONTINUED | OUTPATIENT
Start: 2025-08-23 | End: 2025-08-23

## 2025-08-23 RX ORDER — POTASSIUM CHLORIDE 1500 MG/1
40 TABLET, EXTENDED RELEASE ORAL ONCE
Status: COMPLETED | OUTPATIENT
Start: 2025-08-23 | End: 2025-08-23

## 2025-08-23 RX ADMIN — DULOXETINE 20 MG: 20 CAPSULE, DELAYED RELEASE ORAL at 05:19

## 2025-08-23 RX ADMIN — SENNOSIDES AND DOCUSATE SODIUM 2 TABLET: 50; 8.6 TABLET ORAL at 18:00

## 2025-08-23 RX ADMIN — EZETIMIBE 10 MG: 10 TABLET ORAL at 18:00

## 2025-08-23 RX ADMIN — GABAPENTIN 300 MG: 300 CAPSULE ORAL at 14:29

## 2025-08-23 RX ADMIN — LABETALOL HYDROCHLORIDE 10 MG: 5 INJECTION INTRAVENOUS at 14:15

## 2025-08-23 RX ADMIN — GABAPENTIN 300 MG: 300 CAPSULE ORAL at 09:08

## 2025-08-23 RX ADMIN — ESCITALOPRAM OXALATE 20 MG: 10 TABLET ORAL at 05:19

## 2025-08-23 RX ADMIN — GABAPENTIN 300 MG: 300 CAPSULE ORAL at 20:32

## 2025-08-23 RX ADMIN — METOPROLOL SUCCINATE 50 MG: 25 TABLET, EXTENDED RELEASE ORAL at 05:19

## 2025-08-23 RX ADMIN — POTASSIUM CHLORIDE 40 MEQ: 1500 TABLET, EXTENDED RELEASE ORAL at 12:18

## 2025-08-23 RX ADMIN — CALCIUM CARBONATE (ANTACID) CHEW TAB 500 MG 500 MG: 500 CHEW TAB at 14:29

## 2025-08-23 RX ADMIN — SIMVASTATIN 40 MG: 20 TABLET, FILM COATED ORAL at 18:00

## 2025-08-23 RX ADMIN — APIXABAN 5 MG: 5 TABLET, FILM COATED ORAL at 05:19

## 2025-08-23 RX ADMIN — OMEPRAZOLE 20 MG: 20 CAPSULE, DELAYED RELEASE ORAL at 05:19

## 2025-08-23 RX ADMIN — POTASSIUM CHLORIDE 20 MEQ: 1500 TABLET, EXTENDED RELEASE ORAL at 09:08

## 2025-08-23 RX ADMIN — METOPROLOL SUCCINATE 75 MG: 25 TABLET, EXTENDED RELEASE ORAL at 18:01

## 2025-08-23 RX ADMIN — POTASSIUM CHLORIDE 10 MEQ: 1500 TABLET, EXTENDED RELEASE ORAL at 05:19

## 2025-08-23 RX ADMIN — CALCIUM CARBONATE (ANTACID) CHEW TAB 500 MG 1000 MG: 500 CHEW TAB at 20:33

## 2025-08-23 RX ADMIN — APIXABAN 5 MG: 5 TABLET, FILM COATED ORAL at 18:00

## 2025-08-23 RX ADMIN — HYDRALAZINE HYDROCHLORIDE 10 MG: 20 INJECTION INTRAMUSCULAR; INTRAVENOUS at 13:08

## 2025-08-23 ASSESSMENT — ENCOUNTER SYMPTOMS
SHORTNESS OF BREATH: 0
PALPITATIONS: 1
EYE REDNESS: 0
CHILLS: 0
NAUSEA: 1
EYE DISCHARGE: 0
BRUISES/BLEEDS EASILY: 0
MYALGIAS: 0
ABDOMINAL PAIN: 1
FEVER: 0
FLANK PAIN: 0
FOCAL WEAKNESS: 0
STRIDOR: 0
NERVOUS/ANXIOUS: 0
DIZZINESS: 1
HEADACHES: 1
COUGH: 0

## 2025-08-23 ASSESSMENT — FIBROSIS 4 INDEX: FIB4 SCORE: 2.53

## 2025-08-23 ASSESSMENT — PAIN DESCRIPTION - PAIN TYPE
TYPE: ACUTE PAIN
TYPE: ACUTE PAIN

## 2025-08-24 ENCOUNTER — PHARMACY VISIT (OUTPATIENT)
Dept: PHARMACY | Facility: MEDICAL CENTER | Age: 85
End: 2025-08-24
Payer: COMMERCIAL

## 2025-08-24 VITALS
WEIGHT: 183.86 LBS | BODY MASS INDEX: 26.32 KG/M2 | HEIGHT: 70 IN | DIASTOLIC BLOOD PRESSURE: 96 MMHG | TEMPERATURE: 97 F | RESPIRATION RATE: 17 BRPM | SYSTOLIC BLOOD PRESSURE: 124 MMHG | OXYGEN SATURATION: 98 % | HEART RATE: 82 BPM

## 2025-08-24 PROBLEM — I48.91 ATRIAL FIBRILLATION WITH RAPID VENTRICULAR RESPONSE (HCC): Status: RESOLVED | Noted: 2024-07-26 | Resolved: 2025-08-24

## 2025-08-24 PROBLEM — R11.2 NAUSEA & VOMITING: Status: RESOLVED | Noted: 2025-07-17 | Resolved: 2025-08-24

## 2025-08-24 PROBLEM — E87.6 HYPOKALEMIA: Status: RESOLVED | Noted: 2024-07-24 | Resolved: 2025-08-24

## 2025-08-24 PROBLEM — I16.0 HYPERTENSIVE URGENCY: Status: ACTIVE | Noted: 2025-08-24

## 2025-08-24 PROBLEM — D72.829 LEUKOCYTOSIS: Status: RESOLVED | Noted: 2025-08-21 | Resolved: 2025-08-24

## 2025-08-24 PROBLEM — E87.29 HIGH ANION GAP METABOLIC ACIDOSIS: Status: RESOLVED | Noted: 2024-07-24 | Resolved: 2025-08-24

## 2025-08-24 PROBLEM — I16.0 HYPERTENSIVE URGENCY: Status: RESOLVED | Noted: 2025-08-24 | Resolved: 2025-08-24

## 2025-08-24 PROBLEM — R73.03 PREDIABETES: Status: ACTIVE | Noted: 2025-08-21

## 2025-08-24 LAB
ALBUMIN SERPL BCP-MCNC: 3.4 G/DL (ref 3.2–4.9)
ALBUMIN/GLOB SERPL: 1.4 G/DL
ALP SERPL-CCNC: 65 U/L (ref 30–99)
ALT SERPL-CCNC: 30 U/L (ref 2–50)
ANION GAP SERPL CALC-SCNC: 9 MMOL/L (ref 7–16)
AST SERPL-CCNC: 39 U/L (ref 12–45)
BILIRUB SERPL-MCNC: 1.1 MG/DL (ref 0.1–1.5)
BUN SERPL-MCNC: 14 MG/DL (ref 8–22)
CALCIUM ALBUM COR SERPL-MCNC: 9.4 MG/DL (ref 8.5–10.5)
CALCIUM SERPL-MCNC: 8.9 MG/DL (ref 8.5–10.5)
CHLORIDE SERPL-SCNC: 107 MMOL/L (ref 96–112)
CO2 SERPL-SCNC: 22 MMOL/L (ref 20–33)
CREAT SERPL-MCNC: 0.62 MG/DL (ref 0.5–1.4)
ERYTHROCYTE [DISTWIDTH] IN BLOOD BY AUTOMATED COUNT: 52.5 FL (ref 35.9–50)
GFR SERPLBLD CREATININE-BSD FMLA CKD-EPI: 95 ML/MIN/1.73 M 2
GLOBULIN SER CALC-MCNC: 2.4 G/DL (ref 1.9–3.5)
GLUCOSE BLD STRIP.AUTO-MCNC: 92 MG/DL (ref 65–99)
GLUCOSE SERPL-MCNC: 89 MG/DL (ref 65–99)
HCT VFR BLD AUTO: 37.2 % (ref 42–52)
HGB BLD-MCNC: 12.7 G/DL (ref 14–18)
MAGNESIUM SERPL-MCNC: 1.9 MG/DL (ref 1.5–2.5)
MCH RBC QN AUTO: 33.5 PG (ref 27–33)
MCHC RBC AUTO-ENTMCNC: 34.1 G/DL (ref 32.3–36.5)
MCV RBC AUTO: 98.2 FL (ref 81.4–97.8)
PLATELET # BLD AUTO: 199 K/UL (ref 164–446)
PMV BLD AUTO: 10 FL (ref 9–12.9)
POTASSIUM SERPL-SCNC: 3.8 MMOL/L (ref 3.6–5.5)
PROT SERPL-MCNC: 5.8 G/DL (ref 6–8.2)
RBC # BLD AUTO: 3.79 M/UL (ref 4.7–6.1)
SODIUM SERPL-SCNC: 138 MMOL/L (ref 135–145)
WBC # BLD AUTO: 7.2 K/UL (ref 4.8–10.8)

## 2025-08-24 PROCEDURE — A9270 NON-COVERED ITEM OR SERVICE: HCPCS | Performed by: HOSPITALIST

## 2025-08-24 PROCEDURE — 82962 GLUCOSE BLOOD TEST: CPT | Performed by: HOSPITALIST

## 2025-08-24 PROCEDURE — 97162 PT EVAL MOD COMPLEX 30 MIN: CPT

## 2025-08-24 PROCEDURE — 85027 COMPLETE CBC AUTOMATED: CPT

## 2025-08-24 PROCEDURE — RXMED WILLOW AMBULATORY MEDICATION CHARGE: Performed by: HOSPITALIST

## 2025-08-24 PROCEDURE — 80053 COMPREHEN METABOLIC PANEL: CPT

## 2025-08-24 PROCEDURE — 36415 COLL VENOUS BLD VENIPUNCTURE: CPT

## 2025-08-24 PROCEDURE — 83735 ASSAY OF MAGNESIUM: CPT

## 2025-08-24 PROCEDURE — 94760 N-INVAS EAR/PLS OXIMETRY 1: CPT

## 2025-08-24 PROCEDURE — 700102 HCHG RX REV CODE 250 W/ 637 OVERRIDE(OP): Performed by: HOSPITALIST

## 2025-08-24 PROCEDURE — 97535 SELF CARE MNGMENT TRAINING: CPT

## 2025-08-24 PROCEDURE — 99239 HOSP IP/OBS DSCHRG MGMT >30: CPT | Performed by: HOSPITALIST

## 2025-08-24 RX ORDER — METOPROLOL SUCCINATE 25 MG/1
75 TABLET, EXTENDED RELEASE ORAL DAILY
Qty: 90 TABLET | Refills: 1 | Status: SHIPPED | OUTPATIENT
Start: 2025-08-24

## 2025-08-24 RX ADMIN — GABAPENTIN 300 MG: 300 CAPSULE ORAL at 08:00

## 2025-08-24 RX ADMIN — APIXABAN 5 MG: 5 TABLET, FILM COATED ORAL at 06:24

## 2025-08-24 RX ADMIN — OMEPRAZOLE 20 MG: 20 CAPSULE, DELAYED RELEASE ORAL at 06:24

## 2025-08-24 RX ADMIN — METOPROLOL SUCCINATE 75 MG: 25 TABLET, EXTENDED RELEASE ORAL at 06:23

## 2025-08-24 RX ADMIN — ESCITALOPRAM OXALATE 20 MG: 10 TABLET ORAL at 06:23

## 2025-08-24 RX ADMIN — DULOXETINE 20 MG: 20 CAPSULE, DELAYED RELEASE ORAL at 06:24

## 2025-08-24 RX ADMIN — POTASSIUM CHLORIDE 10 MEQ: 1500 TABLET, EXTENDED RELEASE ORAL at 06:24

## 2025-08-24 ASSESSMENT — GAIT ASSESSMENTS
DEVIATION: DECREASED TOE OFF;DECREASED HEEL STRIKE;STEP TO
ASSISTIVE DEVICE: FRONT WHEEL WALKER
GAIT LEVEL OF ASSIST: STANDBY ASSIST
DISTANCE (FEET): 300
